# Patient Record
Sex: FEMALE | Race: WHITE | Employment: OTHER | URBAN - METROPOLITAN AREA
[De-identification: names, ages, dates, MRNs, and addresses within clinical notes are randomized per-mention and may not be internally consistent; named-entity substitution may affect disease eponyms.]

---

## 2017-05-26 ENCOUNTER — HOSPITAL ENCOUNTER (OUTPATIENT)
Dept: LAB | Age: 67
Discharge: HOME | End: 2017-05-26

## 2017-05-26 PROCEDURE — 88305 TISSUE EXAM BY PATHOLOGIST: CPT | Performed by: INTERNAL MEDICINE

## 2017-05-26 PROCEDURE — 88312 SPECIAL STAINS GROUP 1: CPT | Performed by: INTERNAL MEDICINE

## 2022-09-02 ENCOUNTER — OFFICE VISIT (OUTPATIENT)
Dept: NEUROLOGY | Age: 72
End: 2022-09-02

## 2022-09-02 DIAGNOSIS — G31.01 PRIMARY PROGRESSIVE APHASIA (HCC): Primary | ICD-10-CM

## 2022-09-02 DIAGNOSIS — R56.9 SEIZURE (HCC): ICD-10-CM

## 2022-09-02 DIAGNOSIS — F02.80 PRIMARY PROGRESSIVE APHASIA (HCC): Primary | ICD-10-CM

## 2022-09-02 PROCEDURE — 99214 OFFICE O/P EST MOD 30 MIN: CPT | Performed by: PSYCHIATRY & NEUROLOGY

## 2022-09-02 PROCEDURE — 1123F ACP DISCUSS/DSCN MKR DOCD: CPT | Performed by: PSYCHIATRY & NEUROLOGY

## 2022-09-02 ASSESSMENT — ENCOUNTER SYMPTOMS
EYES NEGATIVE: 1
ALLERGIC/IMMUNOLOGIC NEGATIVE: 1
GASTROINTESTINAL NEGATIVE: 1
RESPIRATORY NEGATIVE: 1

## 2022-09-02 NOTE — PROGRESS NOTES
vitamins as well as working with her to the point where now she can actually brush her teeth and spit out with assistance and that she could not do she lost that 2 years ago. Her recent MRI of her brain is unchanged from 2017 showing mild white matter changes of the brain. She is severely demented had very diffuse difficulties communicating she is a full assistance she has difficulties in ambulating as well. IMAGING REVIEW:  I REVIEWED PERTINENT  IMAGES AND REPORTS WITH THE PATIENT PERSONALLY, DIRECTLY AND FULLY.      Past Medical History:  Past Medical History:   Diagnosis Date    Fibroid, uterine     Other ill-defined conditions(799.89)     Hypothyroidism    Psychiatric disorder     Depression       Past Surgical History:  Past Surgical History:   Procedure Laterality Date    ADENOIDECTOMY      APPENDECTOMY      GYN      Partial Hysterectomy    TONSILLECTOMY         Social History:  Social History     Socioeconomic History    Marital status:      Spouse name: Not on file    Number of children: Not on file    Years of education: Not on file    Highest education level: Not on file   Occupational History    Not on file   Tobacco Use    Smoking status: Never    Smokeless tobacco: Never   Substance and Sexual Activity    Alcohol use: Yes    Drug use: No    Sexual activity: Not on file   Other Topics Concern    Not on file   Social History Narrative    Not on file     Social Determinants of Health     Financial Resource Strain: Not on file   Food Insecurity: Not on file   Transportation Needs: Not on file   Physical Activity: Not on file   Stress: Not on file   Social Connections: Not on file   Intimate Partner Violence: Not on file   Housing Stability: Not on file       Family History:   Family History   Problem Relation Age of Onset    Cancer Mother     Heart Disease Father        Current Outpatient Medications on File Prior to Visit   Medication Sig Dispense Refill    citalopram (CELEXA) 20 MG tablet Take 1 tablet by mouth daily      clonazePAM (KLONOPIN) 0.5 MG tablet Take 0.5 mg by mouth 2 times daily as needed. lamoTRIgine (LAMICTAL) 25 MG tablet Take 1 bid for one month than 2 bid      levETIRAcetam (KEPPRA) 250 MG tablet Take 250 mg by mouth 2 times daily      levothyroxine (SYNTHROID) 75 MCG tablet Take 75 mcg by mouth daily      memantine (NAMENDA) 10 MG tablet Take 10 mg by mouth 2 times daily      mirtazapine (REMERON) 15 MG tablet Take 15 mg by mouth daily       No current facility-administered medications on file prior to visit. Allergies   Allergen Reactions    Sulfa Antibiotics Other (See Comments)     Hematuria    Wasp Venom Protein Hives and Swelling    Penicillins Rash       Review of Systems:  Review of Systems   Constitutional: Negative. HENT: Negative. Eyes: Negative. Respiratory: Negative. Gastrointestinal: Negative. Endocrine: Negative. Genitourinary: Negative. Musculoskeletal: Negative. Skin: Negative. Allergic/Immunologic: Negative. Neurological:         Severe memory loss and progressive aphasia    No flowsheet data found. No flowsheet data found. There were no vitals filed for this visit. Physical Exam  Constitutional:       Appearance: Normal appearance. HENT:      Head: Normocephalic and atraumatic. Eyes:      Extraocular Movements: Extraocular movements intact and EOM normal.   Cardiovascular:      Rate and Rhythm: Normal rate and regular rhythm. Pulses: Normal pulses. Pulmonary:      Effort: Pulmonary effort is normal.   Abdominal:      General: Abdomen is flat. Neurological:      Mental Status: She is alert. Deep Tendon Reflexes:      Reflex Scores:       Tricep reflexes are 1+ on the right side and 1+ on the left side. Bicep reflexes are 1+ on the right side and 1+ on the left side. Brachioradialis reflexes are 1+ on the right side and 1+ on the left side.        Patellar reflexes are 1+ on the right side and 1+ on the left side. Achilles reflexes are 1+ on the right side and 1+ on the left side. Neurologic Exam     Mental Status   She is incapable of making any kind of functional statements or sentences. Incapable of actually answering questions appropriately. Cranial Nerves     CN II   Visual fields full to confrontation. CN III, IV, VI   Extraocular motions are normal.     CN VII   Facial expression full, symmetric. Motor Exam   Right arm tone: decreased  Left arm tone: decreased  Right leg tone: decreased  Left leg tone: decreasedShe is extremely slow and bradykinetic but there is no cogwheel rigidity     Gait, Coordination, and Reflexes     Gait  Gait: wide-based    Tremor   Resting tremor: absent  Intention tremor: absent  Action tremor: absent    Reflexes   Right brachioradialis: 1+  Left brachioradialis: 1+  Right biceps: 1+  Left biceps: 1+  Right triceps: 1+  Left triceps: 1+  Right patellar: 1+  Left patellar: 1+  Right achilles: 1+  Left achilles: 1+She needs assistance with gait. Assessment   Assessment / Plan:    Diagnoses and all orders for this visit:    Primary progressive aphasia ([facility] [address] at this time the patient actually had a minute minimal amount of improvement her grandson has been working with her now she can try to brush her teeth and spit it out. We are going to reinitiate Aricept at 10 mg at bedtime. She is getting continue Namenda 10 twice a day and she is on Klonopin it was 0.5 mg but her grandson is giving her 1/8th of the dose but I am not sure exactly how it is being given nor does the son who is with her. The Keppra she is getting a quarter of the dose or tablet twice a day and the tablets 250 mg which is not doing anything for they discontinued her Lamictal because it made her sedated. Essentially she is not on a seizure medicine the Keppra such a low-dose its not really treating her.   I suggested that if she has any further seizures were going to need to retry a new antiepileptic medication at more appropriate doses. Apparently she is on a bunch of vitamins and \"oils\" and I am not sure if this is CBD oil. If it is CBD oil that may help elevate the seizure threshold. Seizure [facility])        The Diagnosis and differential diagnostic considerations, and Rx Tx were reviewed with the patient at length. No orders of the defined types were placed in this encounter. I have spent greater than 50% of visit discussing and counseling of patient  for treatment and diagnostic plan review. Total time 30 min     . Notes: Patient is to continue all medications as directed by prescribing physicians. Continuations on today's visit are made based on the patient's report of current medications.              Dr. Randye Nyhan  Consultation Neurology, Neurodiagnostics and Neurotherapeutics  Neuroelectrophysiology, EEG, EMG  [facility]  [address] [place] [address], [address] [facility]  Phone:  [phone]  Fax:   [phone]

## 2022-09-27 ENCOUNTER — HOSPITAL ENCOUNTER (EMERGENCY)
Age: 72
Discharge: HOME | End: 2022-09-27
Attending: EMERGENCY MEDICINE
Payer: COMMERCIAL

## 2022-09-27 VITALS
RESPIRATION RATE: 15 BRPM | HEIGHT: 66 IN | DIASTOLIC BLOOD PRESSURE: 69 MMHG | HEART RATE: 67 BPM | SYSTOLIC BLOOD PRESSURE: 116 MMHG | BODY MASS INDEX: 21.69 KG/M2 | OXYGEN SATURATION: 94 % | TEMPERATURE: 97.9 F | WEIGHT: 135 LBS

## 2022-09-27 DIAGNOSIS — R56.9 SEIZURE (HCC): Primary | ICD-10-CM

## 2022-09-27 LAB
ALBUMIN SERPL-MCNC: 3.2 G/DL (ref 3.2–4.6)
ALBUMIN/GLOB SERPL: 0.9 {RATIO} (ref 1.2–3.5)
ALP SERPL-CCNC: 92 U/L (ref 50–136)
ALT SERPL-CCNC: 30 U/L (ref 12–65)
ANION GAP SERPL CALC-SCNC: 1 MMOL/L (ref 4–13)
AST SERPL-CCNC: 31 U/L (ref 15–37)
BASOPHILS # BLD: 0 K/UL (ref 0–0.2)
BASOPHILS NFR BLD: 1 % (ref 0–2)
BILIRUB SERPL-MCNC: 0.3 MG/DL (ref 0.2–1.1)
BUN SERPL-MCNC: 13 MG/DL (ref 8–23)
CALCIUM SERPL-MCNC: 9.6 MG/DL (ref 8.3–10.4)
CHLORIDE SERPL-SCNC: 107 MMOL/L (ref 101–110)
CO2 SERPL-SCNC: 28 MMOL/L (ref 21–32)
CREAT SERPL-MCNC: 0.7 MG/DL (ref 0.6–1)
DIFFERENTIAL METHOD BLD: ABNORMAL
EOSINOPHIL # BLD: 0.3 K/UL (ref 0–0.8)
EOSINOPHIL NFR BLD: 7 % (ref 0.5–7.8)
ERYTHROCYTE [DISTWIDTH] IN BLOOD BY AUTOMATED COUNT: 13.4 % (ref 11.9–14.6)
GLOBULIN SER CALC-MCNC: 3.7 G/DL (ref 2.3–3.5)
GLUCOSE SERPL-MCNC: 99 MG/DL (ref 65–100)
HCT VFR BLD AUTO: 41.3 % (ref 35.8–46.3)
HGB BLD-MCNC: 13.6 G/DL (ref 11.7–15.4)
IMM GRANULOCYTES # BLD AUTO: 0 K/UL (ref 0–0.5)
IMM GRANULOCYTES NFR BLD AUTO: 1 % (ref 0–5)
LYMPHOCYTES # BLD: 0.8 K/UL (ref 0.5–4.6)
LYMPHOCYTES NFR BLD: 20 % (ref 13–44)
MAGNESIUM SERPL-MCNC: 2.5 MG/DL (ref 1.8–2.4)
MCH RBC QN AUTO: 30.6 PG (ref 26.1–32.9)
MCHC RBC AUTO-ENTMCNC: 32.9 G/DL (ref 31.4–35)
MCV RBC AUTO: 92.8 FL (ref 79.6–97.8)
MONOCYTES # BLD: 0.2 K/UL (ref 0.1–1.3)
MONOCYTES NFR BLD: 6 % (ref 4–12)
NEUTS SEG # BLD: 2.5 K/UL (ref 1.7–8.2)
NEUTS SEG NFR BLD: 65 % (ref 43–78)
NRBC # BLD: 0 K/UL (ref 0–0.2)
PLATELET # BLD AUTO: 299 K/UL (ref 150–450)
PMV BLD AUTO: 8.9 FL (ref 9.4–12.3)
POTASSIUM SERPL-SCNC: 4.2 MMOL/L (ref 3.5–5.1)
PROT SERPL-MCNC: 6.9 G/DL (ref 6.3–8.2)
RBC # BLD AUTO: 4.45 M/UL (ref 4.05–5.2)
SODIUM SERPL-SCNC: 136 MMOL/L (ref 136–145)
WBC # BLD AUTO: 3.8 K/UL (ref 4.3–11.1)

## 2022-09-27 PROCEDURE — 83735 ASSAY OF MAGNESIUM: CPT

## 2022-09-27 PROCEDURE — 85025 COMPLETE CBC W/AUTO DIFF WBC: CPT

## 2022-09-27 PROCEDURE — 80053 COMPREHEN METABOLIC PANEL: CPT

## 2022-09-27 PROCEDURE — 99284 EMERGENCY DEPT VISIT MOD MDM: CPT

## 2022-09-27 ASSESSMENT — ENCOUNTER SYMPTOMS: VOMITING: 0

## 2022-09-27 NOTE — DISCHARGE INSTRUCTIONS
Please return with any fevers, vomiting, confusion, worsening symptoms, or additional concerns. Follow-up with your neurologist for reevaluation.

## 2022-09-27 NOTE — ED NOTES
I have reviewed discharge instructions with the patient and spouse. The patient and spouse verbalized understanding. Patient left ED via Discharge Method: wheelchair to Home with spouse. Opportunity for questions and clarification provided. Patient given 0 scripts. To continue your aftercare when you leave the hospital, you may receive an automated call from our care team to check in on how you are doing. This is a free service and part of our promise to provide the best care and service to meet your aftercare needs.  If you have questions, or wish to unsubscribe from this service please call [phone]. Thank you for Choosing our [facility] Emergency Department.         Kenya Azar RN  09/27/22 3198

## 2022-09-27 NOTE — ED PROVIDER NOTES
Emergency Department Provider Note                   PCP:                Rashaad Muniz MD               Age: 70 y.o. Sex: female       ICD-10-CM    1. Seizure ([facility] [address]  R56.9           DISPOSITION Decision To Discharge 09/27/2022 03:07:16 PM        [facility]       ED Course as of 09/27/22 1514   Tue Sep 27, 2022   1506 His blood work is unremarkable. I do not think she needs any acute intervention. I will discharge her home. [AC]      ED Course User Index  [AC] Cruz Núñez MD        Orders Placed This Encounter   Procedures    CBC with Auto Differential    CMP    Magnesium    Pulse Oximetry    Saline lock IV    Seizure precautions        Medications - No data to display    New Prescriptions    No medications on file        Dinorah Falcon is a 70 y.o. female who presents to the Emergency Department with chief complaint of    Chief Complaint   Patient presents with    Seizures      70-year-old lady presents with Marina's concerns about having had a seizure. History of seizures. I spoke with the son who provided most of the history. He reports that they are trying to wean her off of her Keppra because of some neurocognitive effects. He says that the neurologist warned them that she would in fact have seizures like this. He said that this 1 seem to be a little more vigorous than they anticipated. She had no other recent symptoms including no vomiting, fevers, cough, shortness of breath. Currently she is at her mental baseline. No other associated symptoms. Elements of this note were created using speech recognition software. As such, errors of speech recognition may be present. Review of Systems   Unable to perform ROS: Patient nonverbal (Obtained from son)   Constitutional:  Negative for chills and fever. Gastrointestinal:  Negative for vomiting. Psychiatric/Behavioral:  Negative for confusion.       Past Medical History:   Diagnosis Date    Fibroid, uterine     Other ill-defined WBC 3.8 (L) 4.3 - 11.1 K/uL    RBC 4.45 4.05 - 5.2 M/uL    Hemoglobin 13.6 11.7 - 15.4 g/dL    Hematocrit 41.3 35.8 - 46.3 %    MCV 92.8 79.6 - 97.8 FL    MCH 30.6 26.1 - 32.9 PG    MCHC 32.9 31.4 - 35.0 g/dL    RDW 13.4 11.9 - 14.6 %    Platelets 168 020 - 414 K/uL    MPV 8.9 (L) 9.4 - 12.3 FL    nRBC 0.00 0.0 - 0.2 K/uL    Differential Type AUTOMATED      Seg Neutrophils 65 43 - 78 %    Lymphocytes 20 13 - 44 %    Monocytes 6 4.0 - 12.0 %    Eosinophils % 7 0.5 - 7.8 %    Basophils 1 0.0 - 2.0 %    Immature Granulocytes 1 0.0 - 5.0 %    Segs Absolute 2.5 1.7 - 8.2 K/UL    Absolute Lymph # 0.8 0.5 - 4.6 K/UL    Absolute Mono # 0.2 0.1 - 1.3 K/UL    Absolute Eos # 0.3 0.0 - 0.8 K/UL    Basophils Absolute 0.0 0.0 - 0.2 K/UL    Absolute Immature Granulocyte 0.0 0.0 - 0.5 K/UL   CMP   Result Value Ref Range    Sodium 136 136 - 145 mmol/L    Potassium 4.2 3.5 - 5.1 mmol/L    Chloride 107 101 - 110 mmol/L    CO2 28 21 - 32 mmol/L    Anion Gap 1 (L) 4 - 13 mmol/L    Glucose 99 65 - 100 mg/dL    BUN 13 8 - 23 MG/DL    Creatinine 0.70 0.6 - 1.0 MG/DL    GFR African American >60 >60 ml/min/1.73m2    GFR Non- >60 >60 ml/min/1.73m2    Calcium 9.6 8.3 - 10.4 MG/DL    Total Bilirubin 0.3 0.2 - 1.1 MG/DL    ALT 30 12 - 65 U/L    AST 31 15 - 37 U/L    Alk Phosphatase 92 50 - 136 U/L    Total Protein 6.9 6.3 - 8.2 g/dL    Albumin 3.2 3.2 - 4.6 g/dL    Globulin 3.7 (H) 2.3 - 3.5 g/dL    Albumin/Globulin Ratio 0.9 (L) 1.2 - 3.5     Magnesium   Result Value Ref Range    Magnesium 2.5 (H) 1.8 - 2.4 mg/dL        No orders to display                       Voice dictation software was used during the making of this note. This software is not perfect and grammatical and other typographical errors may be present. This note has not been completely proofread for errors.        August Perez MD  09/27/22 5994

## 2022-09-27 NOTE — ED TRIAGE NOTES
Pt to ed via ems from home for c/o of seizure. 3rd seizure known to family. Pt sees neurologist for seizures. Pt takes keppra and clonazepam daily. Pt did not take this morning.  Son gave her medicine with ems before arriving at hospital.   Bgl 118  BP: 118/72  HR: 72  Resp: 16  O2 sat: 94 RA

## 2022-09-29 ENCOUNTER — CLINICAL DOCUMENTATION (OUTPATIENT)
Dept: NEUROLOGY | Age: 72
End: 2022-09-29

## 2022-09-29 NOTE — PROGRESS NOTES
The patient did see a  on 9/22/2022. For her frontal temporal lobe dementia/primary progressive aphasia. He is going to move forward with genetic testing but its not been done as of yet.   The genetic testing is going to do is [id number] start and then move to other labs

## 2022-11-16 ENCOUNTER — APPOINTMENT (OUTPATIENT)
Dept: GENERAL RADIOLOGY | Age: 72
DRG: 689 | End: 2022-11-16
Payer: MEDICARE

## 2022-11-16 ENCOUNTER — APPOINTMENT (OUTPATIENT)
Dept: CT IMAGING | Age: 72
DRG: 689 | End: 2022-11-16
Payer: MEDICARE

## 2022-11-16 ENCOUNTER — APPOINTMENT (OUTPATIENT)
Dept: ULTRASOUND IMAGING | Age: 72
DRG: 689 | End: 2022-11-16
Payer: MEDICARE

## 2022-11-16 ENCOUNTER — HOSPITAL ENCOUNTER (INPATIENT)
Age: 72
LOS: 5 days | Discharge: HOSPICE | DRG: 689 | End: 2022-11-21
Attending: EMERGENCY MEDICINE | Admitting: INTERNAL MEDICINE
Payer: MEDICARE

## 2022-11-16 DIAGNOSIS — E86.0 MILD DEHYDRATION: ICD-10-CM

## 2022-11-16 DIAGNOSIS — R13.10 DYSPHAGIA, UNSPECIFIED TYPE: ICD-10-CM

## 2022-11-16 DIAGNOSIS — G31.01 PRIMARY PROGRESSIVE APHASIA (HCC): ICD-10-CM

## 2022-11-16 DIAGNOSIS — N30.00 ACUTE CYSTITIS WITHOUT HEMATURIA: Primary | ICD-10-CM

## 2022-11-16 DIAGNOSIS — R11.2 NAUSEA AND VOMITING, UNSPECIFIED VOMITING TYPE: ICD-10-CM

## 2022-11-16 DIAGNOSIS — F02.80 PRIMARY PROGRESSIVE APHASIA (HCC): ICD-10-CM

## 2022-11-16 PROBLEM — G93.41 ACUTE METABOLIC ENCEPHALOPATHY: Status: ACTIVE | Noted: 2022-11-16

## 2022-11-16 PROBLEM — N39.0 ACUTE UTI: Status: ACTIVE | Noted: 2022-11-16

## 2022-11-16 PROBLEM — G31.09 FRONTOTEMPORAL DEMENTIA (HCC): Status: ACTIVE | Noted: 2022-11-16

## 2022-11-16 PROBLEM — R47.01 PROGRESSIVE APHASIA: Status: ACTIVE | Noted: 2022-11-16

## 2022-11-16 PROBLEM — G40.909 SEIZURE DISORDER (HCC): Status: ACTIVE | Noted: 2022-11-16

## 2022-11-16 LAB
ALBUMIN SERPL-MCNC: 3 G/DL (ref 3.2–4.6)
ALBUMIN/GLOB SERPL: 0.8 {RATIO} (ref 0.4–1.6)
ALP SERPL-CCNC: 69 U/L (ref 50–136)
ALT SERPL-CCNC: 24 U/L (ref 12–65)
ANION GAP SERPL CALC-SCNC: 7 MMOL/L (ref 2–11)
APPEARANCE UR: ABNORMAL
AST SERPL-CCNC: 39 U/L (ref 15–37)
BACTERIA URNS QL MICRO: ABNORMAL /HPF
BASOPHILS # BLD: 0 K/UL (ref 0–0.2)
BASOPHILS NFR BLD: 1 % (ref 0–2)
BILIRUB SERPL-MCNC: 0.5 MG/DL (ref 0.2–1.1)
BILIRUB UR QL: NEGATIVE
BUN SERPL-MCNC: 6 MG/DL (ref 8–23)
CALCIUM SERPL-MCNC: 9.4 MG/DL (ref 8.3–10.4)
CASTS URNS QL MICRO: ABNORMAL /LPF
CHLORIDE SERPL-SCNC: 101 MMOL/L (ref 101–110)
CK SERPL-CCNC: 188 U/L (ref 21–215)
CO2 SERPL-SCNC: 26 MMOL/L (ref 21–32)
COLOR UR: YELLOW
CREAT SERPL-MCNC: 0.5 MG/DL (ref 0.6–1)
DIFFERENTIAL METHOD BLD: ABNORMAL
EKG ATRIAL RATE: 85 BPM
EKG DIAGNOSIS: NORMAL
EKG Q-T INTERVAL: 343 MS
EKG QRS DURATION: 89 MS
EKG QTC CALCULATION (BAZETT): 406 MS
EKG R AXIS: 82 DEGREES
EKG T AXIS: 56 DEGREES
EKG VENTRICULAR RATE: 84 BPM
EOSINOPHIL # BLD: 0.2 K/UL (ref 0–0.8)
EOSINOPHIL NFR BLD: 3 % (ref 0.5–7.8)
EPI CELLS #/AREA URNS HPF: ABNORMAL /HPF
ERYTHROCYTE [DISTWIDTH] IN BLOOD BY AUTOMATED COUNT: 13.8 % (ref 11.9–14.6)
FLUAV AG NPH QL IA: NEGATIVE
FLUBV AG NPH QL IA: NEGATIVE
GLOBULIN SER CALC-MCNC: 3.7 G/DL (ref 2.8–4.5)
GLUCOSE SERPL-MCNC: 118 MG/DL (ref 65–100)
GLUCOSE UR STRIP.AUTO-MCNC: NEGATIVE MG/DL
HCT VFR BLD AUTO: 40.1 % (ref 35.8–46.3)
HGB BLD-MCNC: 13.2 G/DL (ref 11.7–15.4)
HGB UR QL STRIP: NEGATIVE
IMM GRANULOCYTES # BLD AUTO: 0 K/UL (ref 0–0.5)
IMM GRANULOCYTES NFR BLD AUTO: 0 % (ref 0–5)
KETONES UR QL STRIP.AUTO: 15 MG/DL
LACTATE SERPL-SCNC: 1 MMOL/L (ref 0.4–2)
LEUKOCYTE ESTERASE UR QL STRIP.AUTO: ABNORMAL
LIPASE SERPL-CCNC: 105 U/L (ref 73–393)
LYMPHOCYTES # BLD: 0.7 K/UL (ref 0.5–4.6)
LYMPHOCYTES NFR BLD: 11 % (ref 13–44)
MCH RBC QN AUTO: 30.1 PG (ref 26.1–32.9)
MCHC RBC AUTO-ENTMCNC: 32.9 G/DL (ref 31.4–35)
MCV RBC AUTO: 91.3 FL (ref 82–102)
MONOCYTES # BLD: 0.4 K/UL (ref 0.1–1.3)
MONOCYTES NFR BLD: 7 % (ref 4–12)
MUCOUS THREADS URNS QL MICRO: ABNORMAL /LPF
NEUTS SEG # BLD: 4.9 K/UL (ref 1.7–8.2)
NEUTS SEG NFR BLD: 78 % (ref 43–78)
NITRITE UR QL STRIP.AUTO: POSITIVE
NRBC # BLD: 0 K/UL (ref 0–0.2)
NT PRO BNP: 309 PG/ML (ref 5–125)
OTHER OBSERVATIONS: ABNORMAL
PH UR STRIP: 6.5 [PH] (ref 5–9)
PLATELET # BLD AUTO: 326 K/UL (ref 150–450)
PMV BLD AUTO: 9.4 FL (ref 9.4–12.3)
POTASSIUM SERPL-SCNC: 4.1 MMOL/L (ref 3.5–5.1)
PROT SERPL-MCNC: 6.7 G/DL (ref 6.3–8.2)
PROT UR STRIP-MCNC: NEGATIVE MG/DL
RBC # BLD AUTO: 4.39 M/UL (ref 4.05–5.2)
RBC #/AREA URNS HPF: ABNORMAL /HPF
SARS-COV-2 RDRP RESP QL NAA+PROBE: NOT DETECTED
SODIUM SERPL-SCNC: 134 MMOL/L (ref 133–143)
SOURCE: NORMAL
SP GR UR REFRACTOMETRY: 1.02 (ref 1–1.02)
SPECIMEN SOURCE: NORMAL
TROPONIN I SERPL HS-MCNC: 62 PG/ML (ref 0–14)
TROPONIN I SERPL HS-MCNC: 64 PG/ML (ref 0–14)
UROBILINOGEN UR QL STRIP.AUTO: 1 EU/DL (ref 0.2–1)
WBC # BLD AUTO: 6.2 K/UL (ref 4.3–11.1)
WBC URNS QL MICRO: ABNORMAL /HPF

## 2022-11-16 PROCEDURE — 2580000003 HC RX 258: Performed by: FAMILY MEDICINE

## 2022-11-16 PROCEDURE — 82550 ASSAY OF CK (CPK): CPT

## 2022-11-16 PROCEDURE — 6360000004 HC RX CONTRAST MEDICATION: Performed by: EMERGENCY MEDICINE

## 2022-11-16 PROCEDURE — A4216 STERILE WATER/SALINE, 10 ML: HCPCS | Performed by: FAMILY MEDICINE

## 2022-11-16 PROCEDURE — 83690 ASSAY OF LIPASE: CPT

## 2022-11-16 PROCEDURE — 6360000002 HC RX W HCPCS: Performed by: FAMILY MEDICINE

## 2022-11-16 PROCEDURE — 87088 URINE BACTERIA CULTURE: CPT

## 2022-11-16 PROCEDURE — 87040 BLOOD CULTURE FOR BACTERIA: CPT

## 2022-11-16 PROCEDURE — 85025 COMPLETE CBC W/AUTO DIFF WBC: CPT

## 2022-11-16 PROCEDURE — 6370000000 HC RX 637 (ALT 250 FOR IP): Performed by: FAMILY MEDICINE

## 2022-11-16 PROCEDURE — 87205 SMEAR GRAM STAIN: CPT

## 2022-11-16 PROCEDURE — 6360000002 HC RX W HCPCS: Performed by: EMERGENCY MEDICINE

## 2022-11-16 PROCEDURE — 97166 OT EVAL MOD COMPLEX 45 MIN: CPT

## 2022-11-16 PROCEDURE — 87804 INFLUENZA ASSAY W/OPTIC: CPT

## 2022-11-16 PROCEDURE — 2580000003 HC RX 258: Performed by: EMERGENCY MEDICINE

## 2022-11-16 PROCEDURE — 80053 COMPREHEN METABOLIC PANEL: CPT

## 2022-11-16 PROCEDURE — 87150 DNA/RNA AMPLIFIED PROBE: CPT

## 2022-11-16 PROCEDURE — 96365 THER/PROPH/DIAG IV INF INIT: CPT

## 2022-11-16 PROCEDURE — 1100000000 HC RM PRIVATE

## 2022-11-16 PROCEDURE — 74018 RADEX ABDOMEN 1 VIEW: CPT

## 2022-11-16 PROCEDURE — 99285 EMERGENCY DEPT VISIT HI MDM: CPT

## 2022-11-16 PROCEDURE — C9113 INJ PANTOPRAZOLE SODIUM, VIA: HCPCS | Performed by: FAMILY MEDICINE

## 2022-11-16 PROCEDURE — 97530 THERAPEUTIC ACTIVITIES: CPT

## 2022-11-16 PROCEDURE — 92610 EVALUATE SWALLOWING FUNCTION: CPT

## 2022-11-16 PROCEDURE — 84484 ASSAY OF TROPONIN QUANT: CPT

## 2022-11-16 PROCEDURE — 97112 NEUROMUSCULAR REEDUCATION: CPT

## 2022-11-16 PROCEDURE — 70450 CT HEAD/BRAIN W/O DYE: CPT

## 2022-11-16 PROCEDURE — 83605 ASSAY OF LACTIC ACID: CPT

## 2022-11-16 PROCEDURE — 83880 ASSAY OF NATRIURETIC PEPTIDE: CPT

## 2022-11-16 PROCEDURE — 87086 URINE CULTURE/COLONY COUNT: CPT

## 2022-11-16 PROCEDURE — 71260 CT THORAX DX C+: CPT | Performed by: EMERGENCY MEDICINE

## 2022-11-16 PROCEDURE — 81003 URINALYSIS AUTO W/O SCOPE: CPT

## 2022-11-16 PROCEDURE — 97163 PT EVAL HIGH COMPLEX 45 MIN: CPT

## 2022-11-16 PROCEDURE — 76770 US EXAM ABDO BACK WALL COMP: CPT

## 2022-11-16 PROCEDURE — 87186 SC STD MICRODIL/AGAR DIL: CPT

## 2022-11-16 PROCEDURE — 71045 X-RAY EXAM CHEST 1 VIEW: CPT

## 2022-11-16 PROCEDURE — 93005 ELECTROCARDIOGRAM TRACING: CPT | Performed by: EMERGENCY MEDICINE

## 2022-11-16 PROCEDURE — 87635 SARS-COV-2 COVID-19 AMP PRB: CPT

## 2022-11-16 RX ORDER — SODIUM CHLORIDE 9 MG/ML
INJECTION, SOLUTION INTRAVENOUS PRN
Status: DISCONTINUED | OUTPATIENT
Start: 2022-11-16 | End: 2022-11-21 | Stop reason: HOSPADM

## 2022-11-16 RX ORDER — MEMANTINE HYDROCHLORIDE 5 MG/1
10 TABLET ORAL 2 TIMES DAILY
Status: DISCONTINUED | OUTPATIENT
Start: 2022-11-16 | End: 2022-11-21 | Stop reason: HOSPADM

## 2022-11-16 RX ORDER — DONEPEZIL HYDROCHLORIDE 10 MG/1
10 TABLET, FILM COATED ORAL NIGHTLY
Status: ON HOLD | COMMUNITY
End: 2022-11-21 | Stop reason: HOSPADM

## 2022-11-16 RX ORDER — ONDANSETRON 2 MG/ML
4 INJECTION INTRAMUSCULAR; INTRAVENOUS EVERY 6 HOURS PRN
Status: DISCONTINUED | OUTPATIENT
Start: 2022-11-16 | End: 2022-11-21 | Stop reason: HOSPADM

## 2022-11-16 RX ORDER — ACETAMINOPHEN 325 MG/1
650 TABLET ORAL EVERY 6 HOURS PRN
Status: DISCONTINUED | OUTPATIENT
Start: 2022-11-16 | End: 2022-11-21 | Stop reason: HOSPADM

## 2022-11-16 RX ORDER — POLYETHYLENE GLYCOL 3350 17 G/17G
17 POWDER, FOR SOLUTION ORAL DAILY
Status: DISCONTINUED | OUTPATIENT
Start: 2022-11-16 | End: 2022-11-21 | Stop reason: HOSPADM

## 2022-11-16 RX ORDER — POLYETHYLENE GLYCOL 3350 17 G/17G
17 POWDER, FOR SOLUTION ORAL DAILY PRN
Status: DISCONTINUED | OUTPATIENT
Start: 2022-11-16 | End: 2022-11-21 | Stop reason: HOSPADM

## 2022-11-16 RX ORDER — LEVOTHYROXINE SODIUM 0.1 MG/1
100 TABLET ORAL DAILY
Status: DISCONTINUED | OUTPATIENT
Start: 2022-11-16 | End: 2022-11-21 | Stop reason: HOSPADM

## 2022-11-16 RX ORDER — LEVETIRACETAM 250 MG/1
250 TABLET ORAL 2 TIMES DAILY
Status: DISCONTINUED | OUTPATIENT
Start: 2022-11-16 | End: 2022-11-16

## 2022-11-16 RX ORDER — SODIUM CHLORIDE 0.9 % (FLUSH) 0.9 %
5-40 SYRINGE (ML) INJECTION EVERY 12 HOURS SCHEDULED
Status: DISCONTINUED | OUTPATIENT
Start: 2022-11-16 | End: 2022-11-21 | Stop reason: HOSPADM

## 2022-11-16 RX ORDER — LORAZEPAM 2 MG/ML
1 INJECTION INTRAMUSCULAR EVERY 4 HOURS PRN
Status: DISCONTINUED | OUTPATIENT
Start: 2022-11-16 | End: 2022-11-20

## 2022-11-16 RX ORDER — FLUTICASONE PROPIONATE 50 MCG
1 SPRAY, SUSPENSION (ML) NASAL DAILY
Status: DISCONTINUED | OUTPATIENT
Start: 2022-11-16 | End: 2022-11-21 | Stop reason: HOSPADM

## 2022-11-16 RX ORDER — CLONAZEPAM 0.5 MG/1
0.25 TABLET ORAL EVERY 12 HOURS
Status: DISCONTINUED | OUTPATIENT
Start: 2022-11-16 | End: 2022-11-21 | Stop reason: HOSPADM

## 2022-11-16 RX ORDER — BISACODYL 10 MG
10 SUPPOSITORY, RECTAL RECTAL DAILY
Status: DISPENSED | OUTPATIENT
Start: 2022-11-16 | End: 2022-11-19

## 2022-11-16 RX ORDER — ASPIRIN 81 MG/1
81 TABLET, CHEWABLE ORAL DAILY
Status: ON HOLD | COMMUNITY
End: 2022-11-21 | Stop reason: HOSPADM

## 2022-11-16 RX ORDER — LEVETIRACETAM 100 MG/ML
20 SOLUTION ORAL 2 TIMES DAILY
Status: DISCONTINUED | OUTPATIENT
Start: 2022-11-16 | End: 2022-11-18

## 2022-11-16 RX ORDER — LAMOTRIGINE 25 MG/1
50 TABLET ORAL 2 TIMES DAILY
Status: DISCONTINUED | OUTPATIENT
Start: 2022-11-16 | End: 2022-11-16

## 2022-11-16 RX ORDER — ONDANSETRON 8 MG/1
4 TABLET, ORALLY DISINTEGRATING ORAL EVERY 8 HOURS PRN
Status: DISCONTINUED | OUTPATIENT
Start: 2022-11-16 | End: 2022-11-21 | Stop reason: HOSPADM

## 2022-11-16 RX ORDER — ACETAMINOPHEN 650 MG/1
650 SUPPOSITORY RECTAL EVERY 6 HOURS PRN
Status: DISCONTINUED | OUTPATIENT
Start: 2022-11-16 | End: 2022-11-21 | Stop reason: HOSPADM

## 2022-11-16 RX ORDER — CITALOPRAM 20 MG/1
20 TABLET ORAL DAILY
Status: DISCONTINUED | OUTPATIENT
Start: 2022-11-16 | End: 2022-11-16

## 2022-11-16 RX ORDER — ASPIRIN 81 MG/1
81 TABLET ORAL DAILY
Status: DISCONTINUED | OUTPATIENT
Start: 2022-11-16 | End: 2022-11-21 | Stop reason: HOSPADM

## 2022-11-16 RX ORDER — SODIUM CHLORIDE, SODIUM LACTATE, POTASSIUM CHLORIDE, CALCIUM CHLORIDE 600; 310; 30; 20 MG/100ML; MG/100ML; MG/100ML; MG/100ML
INJECTION, SOLUTION INTRAVENOUS CONTINUOUS
Status: DISCONTINUED | OUTPATIENT
Start: 2022-11-16 | End: 2022-11-16

## 2022-11-16 RX ORDER — CLONAZEPAM 0.5 MG/1
0.25 TABLET ORAL EVERY 12 HOURS PRN
Status: DISCONTINUED | OUTPATIENT
Start: 2022-11-16 | End: 2022-11-16

## 2022-11-16 RX ORDER — SODIUM CHLORIDE 0.9 % (FLUSH) 0.9 %
5-40 SYRINGE (ML) INJECTION PRN
Status: DISCONTINUED | OUTPATIENT
Start: 2022-11-16 | End: 2022-11-21 | Stop reason: HOSPADM

## 2022-11-16 RX ADMIN — IOPAMIDOL 100 ML: 755 INJECTION, SOLUTION INTRAVENOUS at 05:39

## 2022-11-16 RX ADMIN — SODIUM CHLORIDE, PRESERVATIVE FREE 10 ML: 5 INJECTION INTRAVENOUS at 19:51

## 2022-11-16 RX ADMIN — BISACODYL 10 MG: 10 SUPPOSITORY RECTAL at 17:11

## 2022-11-16 RX ADMIN — CEFTRIAXONE 1000 MG: 1 INJECTION, POWDER, FOR SOLUTION INTRAMUSCULAR; INTRAVENOUS at 06:12

## 2022-11-16 RX ADMIN — SODIUM CHLORIDE 40 MG: 9 INJECTION, SOLUTION INTRAMUSCULAR; INTRAVENOUS; SUBCUTANEOUS at 12:13

## 2022-11-16 RX ADMIN — SODIUM CHLORIDE, POTASSIUM CHLORIDE, SODIUM LACTATE AND CALCIUM CHLORIDE: 600; 310; 30; 20 INJECTION, SOLUTION INTRAVENOUS at 04:17

## 2022-11-16 RX ADMIN — SODIUM CHLORIDE, POTASSIUM CHLORIDE, SODIUM LACTATE AND CALCIUM CHLORIDE: 600; 310; 30; 20 INJECTION, SOLUTION INTRAVENOUS at 16:37

## 2022-11-16 ASSESSMENT — PAIN - FUNCTIONAL ASSESSMENT: PAIN_FUNCTIONAL_ASSESSMENT: NONE - DENIES PAIN

## 2022-11-16 ASSESSMENT — PAIN SCALES - GENERAL: PAINLEVEL_OUTOF10: 0

## 2022-11-16 NOTE — ED NOTES
TRANSFER - OUT REPORT:    Verbal report given to Alicja Newman RN on Jevon  being transferred to [address] [place] [address] for routine progression of patient care       Report consisted of patient's Situation, Background, Assessment and   Recommendations(SBAR). Information from the following report(s) ED SBAR was reviewed with the receiving nurse. [place] Assessment: No data recorded  Lines:   Peripheral IV 11/16/22 Right Antecubital (Active)   Site Assessment Clean, dry & intact 11/16/22 0346   Line Status Blood return noted 11/16/22 0346   Phlebitis Assessment No symptoms 11/16/22 0346   Infiltration Assessment 0 11/16/22 0346       Peripheral IV 11/16/22 Left Forearm (Active)   Site Assessment Clean, dry & intact 11/16/22 0346   Line Status Blood return noted 11/16/22 0346   Phlebitis Assessment No symptoms 11/16/22 0346   Infiltration Assessment 0 11/16/22 0346        Opportunity for questions and clarification was provided.       Patient transported with:  Charlee Danielson RN  11/16/22 9474

## 2022-11-16 NOTE — ED PROVIDER NOTES
Emergency Department Provider Note                   PCP:                Baron Filemon MD               Age: 67 y.o. Sex: female       ICD-10-CM    1. Acute cystitis without hematuria  N30.00       2. Nausea and vomiting, unspecified vomiting type  R11.2       3. Mild dehydration  E86.0       4. Dysphagia, unspecified type  R13.10       5. Primary progressive aphasia ([facility] [address]  G31.01     F02.80           DISPOSITION Decision To Admit 11/16/2022 06:46:33 AM        MDM  Number of Diagnoses or Management Options  Acute cystitis without hematuria  Dysphagia, unspecified type  Mild dehydration  Nausea and vomiting, unspecified vomiting type  Primary progressive aphasia ([facility] [address]  Diagnosis management comments: Work-up reveals patient has some mild dehydration clinically and a urinary tract infection. Given her recent nausea and vomiting and new problem of difficulty swallowing over the last couple weeks we will admit for IV antibiotics and IV hydration. She had also been more immobile over the last couple weeks and had a mildly elevated troponin and BNP, however her CT PE protocol is negative for PE. Incidental 4.1 cm thoracic aortic aneurysm was found and the family has been informed of this finding and need for continued monitoring. Hospitalist agreed to admission. Mental status is baseline with baseline severe aphasia.        Amount and/or Complexity of Data Reviewed  Clinical lab tests: ordered and reviewed  Tests in the radiology section of CPT®: ordered and reviewed  Tests in the medicine section of CPT®: ordered and reviewed  Independent visualization of images, tracings, or specimens: yes    Risk of Complications, Morbidity, and/or Mortality  Presenting problems: moderate  Diagnostic procedures: low  Management options: low    Patient Progress  Patient progress: stable             Orders Placed This Encounter   Procedures    Culture, Blood 1    Culture, Blood 1    Culture, Urine    COVID-19, Rapid Rapid influenza A/B antigens    XR CHEST PORTABLE    CT HEAD WO CONTRAST    CT CHEST PULMONARY EMBOLISM W CONTRAST    CBC with Auto Differential    Comprehensive Metabolic Panel    Urinalysis    Lactic Acid    CK    Lipase    Brain Natriuretic Peptide    Troponin    Troponin    CARDIAC/RESPIRATORY MONITORING    Straight cath    MEASURE RECTAL TEMPERATURE    EKG 12 Lead    Insert peripheral IV        Medications   lactated ringers infusion ( IntraVENous New Bag 11/16/22 1731)   iopamidol (ISOVUE-370) 76 % injection 100 mL (100 mLs IntraVENous Given 11/16/22 0021)   cefTRIAXone (ROCEPHIN) 1,000 mg in sodium chloride 0.9 % 50 mL IVPB mini-bag (0 mg IntraVENous Stopped 11/16/22 0641)       New Prescriptions    No medications on file        Rey Mejia is a 67 y.o. female who presents to the Emergency Department with chief complaint of    Chief Complaint   Patient presents with    Emesis      66-year-old female with a history of advanced progressive aphasia and dementia who is nonverbal presents with concern for vomiting that began this evening and was noted by family. Some of the vomit was noted to be dark in color. Fever reported by EMS prior to arrival.  Patient is unable to give history. Initial history provided to me by the patient's son-in-law who is a emergency department nurse at this hospital.  Apparently the patient has been nonambulatory for a couple of weeks and has not been eating and drinking well for at least a few days. The history is provided by a relative.        Review of Systems   Unable to perform ROS: Dementia     Past Medical History:   Diagnosis Date    Fibroid, uterine     Other ill-defined conditions(999.89)     Hypothyroidism    Psychiatric disorder     Depression        Past Surgical History:   Procedure Laterality Date    ADENOIDECTOMY      APPENDECTOMY      GYN      Partial Hysterectomy    TONSILLECTOMY          Family History   Problem Relation Age of Onset    Cancer Mother Heart Disease Father         Social History     Socioeconomic History    Marital status:    Tobacco Use    Smoking status: Never    Smokeless tobacco: Never   Substance and Sexual Activity    Alcohol use: Yes    Drug use: No         Sulfa antibiotics, Wasp venom protein, and Penicillins     Previous Medications    CITALOPRAM (CELEXA) 20 MG TABLET    Take 1 tablet by mouth daily    CLONAZEPAM (KLONOPIN) 0.5 MG TABLET    Take 0.5 mg by mouth 2 times daily as needed. LAMOTRIGINE (LAMICTAL) 25 MG TABLET    Take 1 bid for one month than 2 bid    LEVETIRACETAM (KEPPRA) 250 MG TABLET    Take 250 mg by mouth 2 times daily    LEVOTHYROXINE (SYNTHROID) 75 MCG TABLET    Take 75 mcg by mouth daily    MEMANTINE (NAMENDA) 10 MG TABLET    Take 10 mg by mouth 2 times daily    MIRTAZAPINE (REMERON) 15 MG TABLET    Take 15 mg by mouth daily        Vitals signs and nursing note reviewed. Patient Vitals for the past 4 hrs:   Temp Pulse Resp BP SpO2   11/16/22 0629 -- 71 14 109/70 94 %   11/16/22 0604 -- 82 15 127/71 94 %   11/16/22 0529 -- 81 16 114/81 95 %   11/16/22 0514 -- 79 15 125/79 95 %   11/16/22 0449 -- 80 19 120/79 94 %   11/16/22 0429 -- 80 16 125/76 94 %   11/16/22 0419 -- 80 18 135/82 96 %   11/16/22 0359 -- 82 15 133/80 95 %   11/16/22 0349 -- 81 19 (!) 132/90 --   11/16/22 0345 99.7 °F (37.6 °C) -- -- -- --   11/16/22 0322 98.5 °F (36.9 °C) 87 17 123/84 98 %          Physical Exam  Vitals and nursing note reviewed. Constitutional:       General: She is not in acute distress. Appearance: Normal appearance. She is ill-appearing. She is not toxic-appearing or diaphoretic. HENT:      Head: Normocephalic and atraumatic. Nose: Nose normal.      Mouth/Throat:      Comments: Mucous membranes are tacky  Eyes:      Conjunctiva/sclera: Conjunctivae normal.      Pupils: Pupils are equal, round, and reactive to light. Cardiovascular:      Rate and Rhythm: Normal rate and regular rhythm.       Pulses: Normal pulses. Heart sounds: Normal heart sounds. Pulmonary:      Effort: Pulmonary effort is normal.      Breath sounds: Normal breath sounds. Abdominal:      General: There is no distension. Palpations: Abdomen is soft. Tenderness: There is no abdominal tenderness. There is no guarding or rebound. Musculoskeletal:         General: Normal range of motion. Skin:     General: Skin is warm and dry. Comments: Reduced skin turgor on the abdomen   Neurological:      Mental Status: She is alert. Mental status is at baseline. GCS: GCS eye subscore is 4. GCS verbal subscore is 2. GCS motor subscore is 5. Cranial Nerves: No facial asymmetry. Sensory: Sensation is intact. Motor: Weakness (Diffuse bilateral weakness, not cooperative with motor exam) present. Comments: Not cooperative with complete neurologic exam   Psychiatric:         Behavior: Behavior normal.        Procedures  EKG interpretation in absence of cardiology  EKG shows a normal sinus rhythm rate of 84, normal axis, no hypertrophy, nonspecific ST-T changes, no STEMI, large amount of artifact in 1 and aVL 3 and aVF and V5  Interpreted by ED physician Dr. Small Many  Results for orders placed or performed during the hospital encounter of 11/16/22   XR CHEST PORTABLE    Narrative    EXAM: XR CHEST PORTABLE    HISTORY: Shortness of breath, possible aspiration, nausea and vomiting. TECHNIQUE: Frontal chest.    COMPARISON: None available. FINDINGS:   The cardiac silhouette, mediastinum are within normal limits. There is mild pulmonary vascular prominence. This is best appreciated on the  right. There is no consolidation, pleural effusion, or pneumothorax. No significant osseous abnormalities are observed. Impression    There is mild pulmonary vascular prominence which may represent congestion. This  is best appreciated on the right. No consolidation or pleural effusion.      CT HEAD WO CONTRAST Narrative    EXAM: CT HEAD WO CONTRAST    HISTORY:  AMS, vomiting, history of seizures. TECHNIQUE: Axial images of the brain were performed without the administration  of intravenous contrast. Images were obtained axial plane and coronal  reformatted images were submitted. Dose reduction technique used: Automated exposure control/Adjustment of the mA  and/or kV according to patient size/Use of iterative reconstruction technique. COMPARISON: MRI from an outside facility dated 3/17/2022. FINDINGS:   There is no evidence of acute intracranial hemorrhage, midline shift, or mass  effect. No intra or extra-axial fluid collections are observed. There are mild chronic appearing microangiopathic changes within the  periventricular white matter. No evidence of acute confluent territorial  infarction. Ventricles and basal cisterns are patent and symmetric. Persistent ventricular  dilatation, not significantly changed. There is mild generalized volume loss. Visualized paranasal sinuses and mastoid air cells are without significant  fluid. Limited aeration of the mastoid air cells. Scalp, soft tissues, and calvarium are within normal limits. Impression    1. No CT evidence of acute intracranial process. 2. Persistent ventricular dilatation, not significantly changed. CT CHEST PULMONARY EMBOLISM W CONTRAST    Narrative    EXAM: CT angiogram chest.    HISTORY: dementia with elevated d dimer and troponin, immobility-PE? .      TECHNIQUE: CT angiographic images of the chest were obtained following  intravenous administration of contrast. Imaging was performed utilizing PE  protocol. Examination was performed in the axial plane and coronal  reconstructions were performed. 3-D MIPS reconstructions of the chest were  obtained. Dose reduction technique used: Automated exposure control/Adjustment of the mA  and/or kV according to patient size/Use of iterative reconstruction technique.     100 mL of Isovue-370 were utilized. COMPARISON: None. FINDINGS:   There is adequate opacification of the pulmonary arterial tree. No significant  filling defects are identified to suggest pulmonary embolism. Main pulmonary  artery is normal in caliber. The heart is not enlarged and there is no pericardial effusion. Aneurysmal  dilatation of the ascending aorta measuring up to 4.1 cm. There are no pathologically enlarged mediastinal hilar or axillary lymph nodes. Trachea and mainstem bronchi are patent. There is no consolidation, pleural  effusion, or pneumothorax. No nodules are seen. Chronic changes in the bilateral  apices. Mild basilar atelectasis on the right. The visualized upper abdomen is unremarkable. Osseous structures are within  normal limits. Impression    1. No evidence of pulmonary embolism. 2. No acute intrathoracic process.     3. Aneurysmal dilatation of the ascending aorta measuring up to 4.1 cm     CBC with Auto Differential   Result Value Ref Range    WBC 6.2 4.3 - 11.1 K/uL    RBC 4.39 4.05 - 5.2 M/uL    Hemoglobin 13.2 11.7 - 15.4 g/dL    Hematocrit 40.1 35.8 - 46.3 %    MCV 91.3 82 - 102 FL    MCH 30.1 26.1 - 32.9 PG    MCHC 32.9 31.4 - 35.0 g/dL    RDW 13.8 11.9 - 14.6 %    Platelets 265 796 - 016 K/uL    MPV 9.4 9.4 - 12.3 FL    nRBC 0.00 0.0 - 0.2 K/uL    Differential Type AUTOMATED      Seg Neutrophils 78 43 - 78 %    Lymphocytes 11 (L) 13 - 44 %    Monocytes 7 4.0 - 12.0 %    Eosinophils % 3 0.5 - 7.8 %    Basophils 1 0.0 - 2.0 %    Immature Granulocytes 0 0.0 - 5.0 %    Segs Absolute 4.9 1.7 - 8.2 K/UL    Absolute Lymph # 0.7 0.5 - 4.6 K/UL    Absolute Mono # 0.4 0.1 - 1.3 K/UL    Absolute Eos # 0.2 0.0 - 0.8 K/UL    Basophils Absolute 0.0 0.0 - 0.2 K/UL    Absolute Immature Granulocyte 0.0 0.0 - 0.5 K/UL   Comprehensive Metabolic Panel   Result Value Ref Range    Sodium 134 133 - 143 mmol/L    Potassium 4.1 3.5 - 5.1 mmol/L    Chloride 101 101 - 110 mmol/L    CO2 26 21 - 32 mmol/L    Anion Gap 7 2 - 11 mmol/L    Glucose 118 (H) 65 - 100 mg/dL    BUN 6 (L) 8 - 23 MG/DL    Creatinine 0.50 (L) 0.6 - 1.0 MG/DL    Est, Glom Filt Rate >60 >60 ml/min/1.73m2    Calcium 9.4 8.3 - 10.4 MG/DL    Total Bilirubin 0.5 0.2 - 1.1 MG/DL    ALT 24 12 - 65 U/L    AST 39 (H) 15 - 37 U/L    Alk Phosphatase 69 50 - 136 U/L    Total Protein 6.7 6.3 - 8.2 g/dL    Albumin 3.0 (L) 3.2 - 4.6 g/dL    Globulin 3.7 2.8 - 4.5 g/dL    Albumin/Globulin Ratio 0.8 0.4 - 1.6     Urinalysis   Result Value Ref Range    Color, UA YELLOW      Appearance CLOUDY      Specific Gravity, UA 1.020 1.001 - 1.023      pH, Urine 6.5 5.0 - 9.0      Protein, UA Negative NEG mg/dL    Glucose, UA Negative NEG mg/dL    Ketones, Urine 15 (A) NEG mg/dL    Bilirubin Urine Negative NEG      Blood, Urine Negative NEG      Urobilinogen, Urine 1.0 0.2 - 1.0 EU/dL    Nitrite, Urine Positive (A) NEG      Leukocyte Esterase, Urine TRACE (A) NEG      WBC, UA 20-50 0 /hpf    RBC, UA 3-5 0 /hpf    Epithelial Cells UA 5-10 0 /hpf    BACTERIA, URINE 4+ (H) 0 /hpf    Casts 3-5 0 /lpf    Mucus, UA 2+ (H) 0 /lpf    Other observations RESULTS VERIFIED MANUALLY     Lactic Acid   Result Value Ref Range    Lactic Acid, Plasma 1.0 0.4 - 2.0 MMOL/L   CK   Result Value Ref Range    Total  21 - 215 U/L   Lipase   Result Value Ref Range    Lipase 105 73 - 393 U/L   Brain Natriuretic Peptide   Result Value Ref Range    NT Pro- (H) 5 - 125 PG/ML   Troponin   Result Value Ref Range    Troponin, High Sensitivity 64.0 (H) 0 - 14 pg/mL   EKG 12 Lead   Result Value Ref Range    Ventricular Rate 84 BPM    Atrial Rate 85 BPM    QRS Duration 89 ms    Q-T Interval 343 ms    QTc Calculation (Bazett) 406 ms    R Axis 82 degrees    T Axis 56 degrees    Diagnosis Normal sinus rhythm         CT HEAD WO CONTRAST   Final Result   1. No CT evidence of acute intracranial process. 2. Persistent ventricular dilatation, not significantly changed.           CT CHEST PULMONARY EMBOLISM W CONTRAST   Final Result   1. No evidence of pulmonary embolism. 2. No acute intrathoracic process. 3. Aneurysmal dilatation of the ascending aorta measuring up to 4.1 cm         XR CHEST PORTABLE   Final Result   There is mild pulmonary vascular prominence which may represent congestion. This   is best appreciated on the right. No consolidation or pleural effusion. Voice dictation software was used during the making of this note. This software is not perfect and grammatical and other typographical errors may be present. This note has not been completely proofread for errors.      Claudia Jane MD  11/16/22 3405

## 2022-11-16 NOTE — H&P
Hospitalist History and Physical   Admit Date:  2022  3:18 AM   Name:  Purnima Garcia   Age:  67 y.o. Sex:  female  :  1950   MRN:  [id number]   Room:  03/    Presenting Complaint: Emesis     Reason(s) for Admission: Acute metabolic encephalopathy [C27.20]     History of Present Illness:   Purnima Garcia is a 67 y.o. female with medical history of NEVAEH on CPAP who presented via EMS from home with nausea and vomiting at coffee-ground substance associated with constipation and no stool output of 1 week duration. EMS reported patient had a fever 102.4. Of note patient follows neurology Dr. Imer Mosqueda and per note 22, progressive aphasia has been worse since  associated with frontotemporal lobe dementia. At some point she stayed at a nursing home/assisted living but now she lives with her  and son with total assist, diffuse difficulties communicating and ambulating. Per neurology's note 2022, reinitiate Aricept 10 mg nightly, continue Namenda 10 mg twice daily, Klonopin 0.25 mg twice daily. Off of Lamictal due to sedation. Keppra at current dose so low that is not treating her. On mainly vitamins and oils at home. In ED, CBC and CMP unremarkable. UA c/o UTI. Pt was started on Rocephin. Family at bedside on admission. Review of Systems:  10 systems reviewed and negative except as noted in HPI. Assessment & Plan:     Acute UTI  Abx: Rocephin (-. ..)  Ucx: pending  Obtain renal US to r/o obstruction    Progressive aphasia  Severe frontal temporal lobe dementia  History of seizure disorder  Patient follows neurology Dr. Imer Mosqueda and per note, aphasia has been worse since  associated with frontotemporal lobe dementia. At baseline, she lives with her  and son with total assist, diffuse difficulties communicating and ambulating. Nonverbal mostly and not following much commands.   Per neurology's note 2022, reinitiate Aricept 10 mg nightly, continue Namenda 10 mg twice daily, Klonopin 0.25 mg twice daily. Off of Lamictal due to sedation. Keppra at current dose so low that is not treating her. On mainly vitamins and oils at home  CT head on admission shows chronic findings  Continue supportive care  Cont home Klonopin, Keppra, Klonopin, Namenda--but holding for now d/t NPO  Limit sedating meds  Delirium precautions  PT/OT  SLP eval--NPO recommended for now, re-evaluate tomorrow    Nausea and vomiting, resolved  Reported coffee ground. Hgb 13.2 on admission. Could be 2/2 gastritis. KUB w/o obstruction. CT chest was negative for PE; no acute process  Antiemetics prn  Start PPI IV  Hold off on IVF as CXR shows mild vascular congestion  Monitor for improvement     Constipation  KUB obtained on admission w/o obstruction  Start Miralax daily and Dulcolax suppository daily  Monitor BM    Ascending Aorta Aneurysm  Incidentally captured on CT chest measuring up to 4.1cm on admission  CTM outpatient, needs vascular surgery referral for continued surveillance    Hypothyroidism  Cont home Synthroid     NEVAEH  Continue home CPAP nightly    Management of Delirium      Non-pharmacological intervention  Reorient the patient throughout the day  Window open and lights on during the day. Lights off, television off, noises down during the night. If able, decrease nursing checks at night  Therapies as often as possible  Avoid restraints to the best of your ability   Avoid sensory deprivation by using glasses and hearing aids, if applicable        Pharmacological intervention  Replete electrolyte abnormalities and correct metabolic abnormalities  Limit benzodiazepines, antihistamines, narcotics, anticholinergics. Preference towards Precedex for sedation over fentanyl and benzodiazepines/GABAa agonists.    For dangerous behavior/aggression to self or others can consider Zyprexa or Seroquel if benefits outweigh risk  For persistent insomnia can use melatonin four hours prior to bedtime or Seroquel 25 mg at bedtime         Anticipated discharge needs:     >2 midnights pending cultures    Diet: No diet orders on file  VTE ppx: SCD's  Code status: No Order    Hospital Problems:  Principal Problem:    Acute metabolic encephalopathy  Resolved Problems:    * No resolved hospital problems.  *       Past History:     Past Medical History:   Diagnosis Date    Fibroid, uterine     Other ill-defined conditions(799.89)     Hypothyroidism    Psychiatric disorder     Depression       Past Surgical History:   Procedure Laterality Date    ADENOIDECTOMY      APPENDECTOMY      GYN      Partial Hysterectomy    TONSILLECTOMY          Social History     Tobacco Use    Smoking status: Never    Smokeless tobacco: Never   Substance Use Topics    Alcohol use: Yes      Social History     Substance and Sexual Activity   Drug Use No       Family History   Problem Relation Age of Onset    Cancer Mother     Heart Disease Father         Immunization History   Administered Date(s) Administered    COVID-19, PFIZER PURPLE top, DILUTE for use, (age 15 y+), 30mcg/0.3mL 08/27/2021     Allergies   Allergen Reactions    Sulfa Antibiotics Other (See Comments)     Hematuria    Wasp Venom Protein Hives and Swelling    Penicillins Rash     Prior to Admit Medications:  Current Outpatient Medications   Medication Instructions    citalopram (CELEXA) 20 MG tablet 1 tablet, Oral, DAILY    clonazePAM (KLONOPIN) 0.5 mg, Oral, 2 TIMES DAILY PRN    lamoTRIgine (LAMICTAL) 25 MG tablet Take 1 bid for one month than 2 bid    levETIRAcetam (KEPPRA) 250 mg, Oral, 2 TIMES DAILY    levothyroxine (SYNTHROID) 75 mcg, Oral, DAILY    memantine (NAMENDA) 10 mg, Oral, 2 TIMES DAILY    mirtazapine (REMERON) 15 mg, Oral, DAILY         Objective:   Patient Vitals for the past 24 hrs:   Temp Pulse Resp BP SpO2   11/16/22 0629 -- 71 14 109/70 94 %   11/16/22 0604 -- 82 15 127/71 94 %   11/16/22 0529 -- 81 16 114/81 95 %   11/16/22 0514 -- 79 15 125/79 95 %   11/16/22 0449 -- 80 19 120/79 94 %   11/16/22 0429 -- 80 16 125/76 94 %   11/16/22 0419 -- 80 18 135/82 96 %   11/16/22 0359 -- 82 15 133/80 95 %   11/16/22 0349 -- 81 19 (!) 132/90 --   11/16/22 0345 99.7 °F (37.6 °C) -- -- -- --   11/16/22 0322 98.5 °F (36.9 °C) 87 17 123/84 98 %       Oxygen Therapy  SpO2: 94 %    Estimated body mass index is 21.79 kg/m² as calculated from the following:    Height as of 9/27/22: 5' 6\" (1.676 m). Weight as of 9/27/22: 135 lb (61.2 kg). No intake or output data in the 24 hours ending 11/16/22 0736      Physical Exam:    Blood pressure 109/70, pulse 71, temperature 99.7 °F (37.6 °C), temperature source Rectal, resp. rate 14, SpO2 94 %. General:    Appears older than stated age. No acute distress. Nonverbal  Head:  Normocephalic, atraumatic  Eyes:  Sclerae appear normal.  Pupils equally round. ENT:  Nares appear normal, no drainage. Moist oral mucosa  Neck:  No restricted ROM. Trachea midline   CV:   RRR. No m/r/g. No jugular venous distension. Lungs:   Congested cough sounds. No wheezing, rhonchi, or rales. Symmetric expansion. Abdomen: Bowel sounds present. Soft, nontender, nondistended. Extremities: No cyanosis or clubbing. No edema  Skin:     No rashes and normal coloration. Warm and dry. Neuro:  Unable to examine. Non-verbal. Follows basic commands. Psych:  Normal mood and affect.       I have personally reviewed labs and tests showing:  Recent Labs:  Recent Results (from the past 24 hour(s))   CBC with Auto Differential    Collection Time: 11/16/22  3:45 AM   Result Value Ref Range    WBC 6.2 4.3 - 11.1 K/uL    RBC 4.39 4.05 - 5.2 M/uL    Hemoglobin 13.2 11.7 - 15.4 g/dL    Hematocrit 40.1 35.8 - 46.3 %    MCV 91.3 82 - 102 FL    MCH 30.1 26.1 - 32.9 PG    MCHC 32.9 31.4 - 35.0 g/dL    RDW 13.8 11.9 - 14.6 %    Platelets 617 745 - 253 K/uL    MPV 9.4 9.4 - 12.3 FL    nRBC 0.00 0.0 - 0.2 K/uL    Differential Type AUTOMATED      Seg Neutrophils 78 43 - 78 % Lymphocytes 11 (L) 13 - 44 %    Monocytes 7 4.0 - 12.0 %    Eosinophils % 3 0.5 - 7.8 %    Basophils 1 0.0 - 2.0 %    Immature Granulocytes 0 0.0 - 5.0 %    Segs Absolute 4.9 1.7 - 8.2 K/UL    Absolute Lymph # 0.7 0.5 - 4.6 K/UL    Absolute Mono # 0.4 0.1 - 1.3 K/UL    Absolute Eos # 0.2 0.0 - 0.8 K/UL    Basophils Absolute 0.0 0.0 - 0.2 K/UL    Absolute Immature Granulocyte 0.0 0.0 - 0.5 K/UL   Comprehensive Metabolic Panel    Collection Time: 11/16/22  3:45 AM   Result Value Ref Range    Sodium 134 133 - 143 mmol/L    Potassium 4.1 3.5 - 5.1 mmol/L    Chloride 101 101 - 110 mmol/L    CO2 26 21 - 32 mmol/L    Anion Gap 7 2 - 11 mmol/L    Glucose 118 (H) 65 - 100 mg/dL    BUN 6 (L) 8 - 23 MG/DL    Creatinine 0.50 (L) 0.6 - 1.0 MG/DL    Est, Glom Filt Rate >60 >60 ml/min/1.73m2    Calcium 9.4 8.3 - 10.4 MG/DL    Total Bilirubin 0.5 0.2 - 1.1 MG/DL    ALT 24 12 - 65 U/L    AST 39 (H) 15 - 37 U/L    Alk Phosphatase 69 50 - 136 U/L    Total Protein 6.7 6.3 - 8.2 g/dL    Albumin 3.0 (L) 3.2 - 4.6 g/dL    Globulin 3.7 2.8 - 4.5 g/dL    Albumin/Globulin Ratio 0.8 0.4 - 1.6     CK    Collection Time: 11/16/22  3:45 AM   Result Value Ref Range    Total  21 - 215 U/L   Lipase    Collection Time: 11/16/22  3:45 AM   Result Value Ref Range    Lipase 105 73 - 393 U/L   Brain Natriuretic Peptide    Collection Time: 11/16/22  3:45 AM   Result Value Ref Range    NT Pro- (H) 5 - 125 PG/ML   Troponin    Collection Time: 11/16/22  3:45 AM   Result Value Ref Range    Troponin, High Sensitivity 64.0 (H) 0 - 14 pg/mL   Lactic Acid    Collection Time: 11/16/22  3:47 AM   Result Value Ref Range    Lactic Acid, Plasma 1.0 0.4 - 2.0 MMOL/L   Urinalysis    Collection Time: 11/16/22  3:57 AM   Result Value Ref Range    Color, UA YELLOW      Appearance CLOUDY      Specific Gravity, UA 1.020 1.001 - 1.023      pH, Urine 6.5 5.0 - 9.0      Protein, UA Negative NEG mg/dL    Glucose, UA Negative NEG mg/dL    Ketones, Urine 15 (A) NEG mg/dL    Bilirubin Urine Negative NEG      Blood, Urine Negative NEG      Urobilinogen, Urine 1.0 0.2 - 1.0 EU/dL    Nitrite, Urine Positive (A) NEG      Leukocyte Esterase, Urine TRACE (A) NEG      WBC, UA 20-50 0 /hpf    RBC, UA 3-5 0 /hpf    Epithelial Cells UA 5-10 0 /hpf    BACTERIA, URINE 4+ (H) 0 /hpf    Casts 3-5 0 /lpf    Mucus, UA 2+ (H) 0 /lpf    Other observations RESULTS VERIFIED MANUALLY     EKG 12 Lead    Collection Time: 11/16/22  5:04 AM   Result Value Ref Range    Ventricular Rate 84 BPM    Atrial Rate 85 BPM    QRS Duration 89 ms    Q-T Interval 343 ms    QTc Calculation (Bazett) 406 ms    R Axis 82 degrees    T Axis 56 degrees    Diagnosis Normal sinus rhythm    Troponin    Collection Time: 11/16/22  6:12 AM   Result Value Ref Range    Troponin, High Sensitivity 62.0 (H) 0 - 14 pg/mL       I have personally reviewed imaging studies showing:  CT HEAD WO CONTRAST    Result Date: 11/16/2022  EXAM: CT HEAD WO CONTRAST HISTORY:  AMS, vomiting, history of seizures. TECHNIQUE: Axial images of the brain were performed without the administration of intravenous contrast. Images were obtained axial plane and coronal reformatted images were submitted. Dose reduction technique used: Automated exposure control/Adjustment of the mA and/or kV according to patient size/Use of iterative reconstruction technique. COMPARISON: MRI from an outside facility dated 3/17/2022. FINDINGS: There is no evidence of acute intracranial hemorrhage, midline shift, or mass effect. No intra or extra-axial fluid collections are observed. There are mild chronic appearing microangiopathic changes within the periventricular white matter. No evidence of acute confluent territorial infarction. Ventricles and basal cisterns are patent and symmetric. Persistent ventricular dilatation, not significantly changed. There is mild generalized volume loss. Visualized paranasal sinuses and mastoid air cells are without significant fluid.  Limited aeration of the mastoid air cells. Scalp, soft tissues, and calvarium are within normal limits. 1. No CT evidence of acute intracranial process. 2. Persistent ventricular dilatation, not significantly changed. XR CHEST PORTABLE    Result Date: 11/16/2022  EXAM: XR CHEST PORTABLE HISTORY: Shortness of breath, possible aspiration, nausea and vomiting. TECHNIQUE: Frontal chest. COMPARISON: None available. FINDINGS: The cardiac silhouette, mediastinum are within normal limits. There is mild pulmonary vascular prominence. This is best appreciated on the right. There is no consolidation, pleural effusion, or pneumothorax. No significant osseous abnormalities are observed. There is mild pulmonary vascular prominence which may represent congestion. This is best appreciated on the right. No consolidation or pleural effusion. CT CHEST PULMONARY EMBOLISM W CONTRAST    Result Date: 11/16/2022  EXAM: CT angiogram chest. HISTORY: dementia with elevated d dimer and troponin, immobility-PE? .  TECHNIQUE: CT angiographic images of the chest were obtained following intravenous administration of contrast. Imaging was performed utilizing PE protocol. Examination was performed in the axial plane and coronal reconstructions were performed. 3-D MIPS reconstructions of the chest were obtained. Dose reduction technique used: Automated exposure control/Adjustment of the mA and/or kV according to patient size/Use of iterative reconstruction technique. 100 mL of Isovue-370 were utilized. COMPARISON: None. FINDINGS: There is adequate opacification of the pulmonary arterial tree. No significant filling defects are identified to suggest pulmonary embolism. Main pulmonary artery is normal in caliber. The heart is not enlarged and there is no pericardial effusion. Aneurysmal dilatation of the ascending aorta measuring up to 4.1 cm. There are no pathologically enlarged mediastinal hilar or axillary lymph nodes.  Trachea and mainstem bronchi are patent. There is no consolidation, pleural effusion, or pneumothorax. No nodules are seen. Chronic changes in the bilateral apices. Mild basilar atelectasis on the right. The visualized upper abdomen is unremarkable. Osseous structures are within normal limits. 1. No evidence of pulmonary embolism. 2. No acute intrathoracic process. 3. Aneurysmal dilatation of the ascending aorta measuring up to 4.1 cm       Echocardiogram:  No results found for this or any previous visit. Orders Placed This Encounter   Medications    lactated ringers infusion    iopamidol (ISOVUE-370) 76 % injection 100 mL    cefTRIAXone (ROCEPHIN) 1,000 mg in sodium chloride 0.9 % 50 mL IVPB mini-bag     Order Specific Question:   Antimicrobial Indications     Answer:   Urinary Tract Infection         Signed: Miesha Salamanca DO    Part of this note may have been written by using a voice dictation software. The note has been proof read but may still contain some grammatical/other typographical errors.

## 2022-11-16 NOTE — PROGRESS NOTES
LTG: Patient will tolerate safest, least restrictive oral diet without s/sx airway compromise  STG: Patient will tolerate ongoing po trials in efforts to advance diet as appropriate   STG: Patient will participate in modified barium swallow study to objectively assess oropharyngeal swallow if indicated      SPEECH LANGUAGE PATHOLOGY: DYSPHAGIA  Initial Assessment    NAME: Antony Roa  : 1950  MRN: [id number]    ADMISSION DATE: 2022  PRIMARY DIAGNOSIS: Acute metabolic encephalopathy  Acute metabolic encephalopathy [E71.00]    ICD-10: Treatment Diagnosis: R13.12 Dysphagia, Oropharyngeal Phase    RECOMMENDATIONS   Diet:  Diet Solids Recommendation: NPO  Liquid Consistency Recommendation: NPO    Medications: Other (defer to MD)     Recommendations: Therapeutic feeds with SLP only    Therapeutic Interventions: Patient/Family education; Therapeutic PO trials with SLP;Oral care   Compensatory Swallowing Strategies:  n/a   Patient continues to require skilled intervention: Yes  D/C Recommendations: Ongoing speech therapy is recommended during this hospitalization, Ongoing speech therapy is recommended at next level of care     ASSESSMENT    Dysphagia Diagnosis: Moderate oral stage dysphagia; Moderate pharyngeal stage dysphagia; Suspected needs further assessment  Patient demonstrates baseline congestion. she has weak/ineffective cough. Patient at risk of aspiration given vomiting episodes and difficulty with clearance. Limited evaluation of oropharyngeal swallow this date. Recommend NPO. Will re-evaluate oropharyngeal swallow tomorrow. GENERAL    History of Present Injury/Illness: Ms. Carmenza Vance  has a past medical history of Fibroid, uterine, Other ill-defined conditions(799.89), and Psychiatric disorder. . She also  has a past surgical history that includes Adenoidectomy; Appendectomy; gyn; and Tonsillectomy.     Chart Reviewed: Yes  General Comment  Comments: pt admitted with nausea/vomiting and encephalopathy  Subjective: daughter at bedside. pt asleep but roused easily. minimally verbal.  Communication Observation:  (followed by outpatient neurology who reports symptoms consistent with primary progressive aphasia)  Inconsistent verbal responses \"yeah\" or \"no\"; does not consistently respond. Poor command following. Prior Dysphagia History: per daughter, patient eats sofer solids and family cuts into bite sized pieces. Current Liquid Diet : Thin, Clear    Respiratory Status: Room air              Pain:   Patient does not appear in pain                                        OBJECTIVE    Patient Positioning: Upright in bed   Oral Motor   Labial: No impairment  Oral Hygiene: Clean  Lingual: No impairment  Dentition: Adequate    Baseline Vocal Quality: Weak    Oropharyngeal Phase:  Patient with audible congestion prior to trials. No oral intake today. Weak ineffective clearance. Not able to follow commands. Unable to bring secretions into oral cavity for suctioning. Presented small ice chip and patient demonstrates functional swallow upon palpation; however, increased congestion and difficulty clearing with weak cough/throat clear. Further trials deferred due to impaired secretion management and increased risk of aspiration. Also concerns of tolerance given recent vomiting episodes/GI issues. PLAN    Duration/Frequency: Continue to follow patient 3x/week for duration of hospitalization and/or until goals met    Dysphagia Outcome and Severity Scale (ANICETO)  Dysphagia Outcome Severity Scale: Level 3: Moderate dysphagia- Total assisstance, supervision or strategies.  Two or more diet consistencies restricted  Interpretation of Tool: The Dysphagia Outcome and Severity Scale (ANICETO) is a simple, easy-to-use, 7-point scale developed to systematically rate the functional severity of dysphagia based on objective assessment and make recommendations for diet level, independence level, and type of nutrition. Normal(7), Functional(6), Mild(5), Mild-Moderate(4), Moderate(3), Moderate-Severe(2), Severe(1)    Speech Therapy Prognosis  Prognosis: Good    Education: Patient, Family member, RN; Dr. Ousmane Panda notified via [facility]. Patient Education: discussed with patient's daughter:  NPO status, aspiration concerns. daughter expressed understanding.   Patient Education Response: Verbalizes understanding, Needs reinforcement    PRECAUTIONS/ALLERGIES: Sulfa antibiotics, Wasp venom protein, and Penicillins   Safety Devices in place: Yes  Type of devices: Call light within reach, Nurse notified (daughter at bedside)    Therapy Time  SLP Individual Minutes  Time In: 5556  Time Out: 1886  Minutes: 1950 [place], [place]  11/16/2022 1:18 PM

## 2022-11-16 NOTE — PROGRESS NOTES
TRANSFER - IN REPORT:    Verbal report received from LESLIE Asif on [employer]  being received from ED for routine progression of patient care      Report consisted of patient's Situation, Background, Assessment and   Recommendations(SBAR). Information from the following report(s) Nurse Handoff Report, Intake/Output, MAR, and Recent Results was reviewed with the receiving nurse. Opportunity for questions and clarification was provided. Assessment completed upon patient's arrival to unit and care assumed.

## 2022-11-16 NOTE — PROGRESS NOTES
ACUTE PHYSICAL THERAPY GOALS:   (Developed with and agreed upon by patient and/or caregiver. )  LTG:  (1.)Ms. Jian Feldman will move from supine to sit and sit to supine , scoot up and down, and roll side to side in bed with MODERATE ASSIST within 7 treatment day(s). (2.)Ms. Jian Feldman will transfer from bed to chair and chair to bed with MODERATE ASSIST x2 using the least restrictive device within 7 treatment day(s). (3.)Ms. Jian Felmdan will ambulate with MAX ASSISTx2 for 50 feet with the least restrictive device within 7 treatment day(s). ________________________________________________________________________________________________     PHYSICAL THERAPY Initial Assessment and Daily Note  (Link to Caseload Tracking: PT Visit Days : 1  Acknowledge Orders  Time In/Out  PT Charge Capture  Rehab Caseload Tracker    Amari Herman is a 67 y.o. female   PRIMARY DIAGNOSIS: Acute UTI  Mild dehydration [E86.0]  Primary progressive aphasia ([address] [G31.01, F02.80]  Acute cystitis without hematuria [U37.28]  Acute metabolic encephalopathy [D51.87]  Dysphagia, unspecified type [R13.10]  Nausea and vomiting, unspecified vomiting type [R11.2]       Reason for Referral: Generalized Muscle Weakness (M62.81)  Difficulty in walking, Not elsewhere classified (R26.2)  Other abnormalities of gait and mobility (R26.89)  Inpatient: Payor: Spring Ayala / Plan: Spring Ayala / Product Type: *No Product type* /     ASSESSMENT:     REHAB RECOMMENDATIONS:   Recommendation to date pending progress:  [address] [place] [address], return home with assistance of family    Equipment:    To Be Determined     ASSESSMENT:  Ms. Jian Feldman presents with n/v and baseline severe aphasia and dementia. She has been living at home with her  and son who assist her will all mobility and daughters who come and assist with bathing twice a week. Up until two weeks ago she was ambulating with assistance within her home and standing up with use of grab bars in the shower.  Pt with decreased strength, mobility and overall activity tolerance compared with her baseline. She recently had HHPT come in and family was provided with education for transfers and care. Per her daughter she functions better in the afternoons and is more likely to participate after lunch. Pt would continue to benefit from skilled acute care PT to facilitate improvements in the above stated deficits and promote return to baseline function. HHPT recommended with return home with 24/7 care. [facility] [address] [address] AM-PAC 6 Clicks Basic Mobility Inpatient Short Form  AM-PAC Mobility Inpatient   How much difficulty turning over in bed?: A Lot  How much difficulty sitting down on / standing up from a chair with arms?: Unable  How much difficulty moving from lying on back to sitting on side of bed?: Unable  How much help from another person moving to and from a bed to a chair?: Total  How much help from another person needed to walk in hospital room?: Total  How much help from another person for climbing 3-5 steps with a railing?: Total  AM-PAC Inpatient Mobility Raw Score : 7  AM-PAC Inpatient T-Scale Score : 26.42  Mobility Inpatient CMS 0-100% Score: 92.36  Mobility Inpatient CMS G-Code Modifier : CM    SUBJECTIVE:   Ms. Stephanie Bailey has severe aphasia but smiles and nods throughout.     Social/Functional Lives With: Spouse, Son  Type of Home: House  Receives Help From: Family  ADL Assistance: Needs assistance  Ambulation Assistance: Needs assistance  Transfer Assistance: Needs assistance    OBJECTIVE:     PAIN: VITALS / O2: PRECAUTION / Maye Garre / DRAINS:   Pre Treatment:   Pain Assessment: Adult Nonverbal Pain Scale (NPVS)      Post Treatment: No signs of pain post mobility Vitals        Oxygen      IV and Purewick    RESTRICTIONS/PRECAUTIONS:                    GROSS EVALUATION: Intact Impaired (Comments):   AROM []  Unable to follow commands at time of attempt   PROM [] Resisting mobility, decreased ROM   Strength [] Generalized muscle weakness in BLE   Balance []  Unable to assess due to minimal mobility   Posture [] N/A   Sensation []     Coordination []      Tone []     Edema []    Activity Tolerance []  Tolerates minimal mobility    []      COGNITION/  PERCEPTION: Intact Impaired (Comments):   Orientation []  Nonverbal, unable to answer questions   Vision []     Hearing []  Unable to assess   Cognition  []  Unable to assess, significant aphasia and dementia     MOBILITY: I Mod I S SBA CGA Min Mod Max Total  NT x2 Comments:   Bed Mobility    Rolling [] [] [] [] [] [] [] [] [x] [] [x]    Supine to Sit [] [] [] [] [] [] [] [] [] [x] []    Scooting [] [] [] [] [] [] [] [] [x] [] [x]    Sit to Supine [] [] [] [] [] [] [] [] [] [x] []    Transfers    Sit to Stand [] [] [] [] [] [] [] [] [] [x] []    Bed to Chair [] [] [] [] [] [] [] [] [] [x] []    Stand to Sit [] [] [] [] [] [] [] [] [] [x] []     [] [] [] [] [] [] [] [] [] [x] []    I=Independent, Mod I=Modified Independent, S=Supervision, SBA=Standby Assistance, CGA=Contact Guard Assistance,   Min=Minimal Assistance, Mod=Moderate Assistance, Max=Maximal Assistance, Total=Total Assistance, NT=Not Tested    GAIT: I Mod I S SBA CGA Min Mod Max Total  NT x2 Comments:   Level of Assistance [] [] [] [] [] [] [] [] [] [x] []    Distance   feet    DME N/A    Gait Quality N/A    Weightbearing Status      Stairs      I=Independent, Mod I=Modified Independent, S=Supervision, SBA=Standby Assistance, CGA=Contact Guard Assistance,   Min=Minimal Assistance, Mod=Moderate Assistance, Max=Maximal Assistance, Total=Total Assistance, NT=Not Tested    PLAN:   FREQUENCY AND DURATION: 3 times/week for duration of hospital stay or until stated goals are met, whichever comes first.    THERAPY PROGNOSIS: Guarded    PROBLEM LIST:   (Skilled intervention is medically necessary to address:)  Decreased ADL/Functional Activities  Decreased Activity Tolerance  Decreased AROM/PROM  Decreased Balance  Decreased Coordination  Decreased Gait Ability  Decreased Safety Awareness  Decreased Strength  Decreased Transfer Abilities  Increased Pain INTERVENTIONS PLANNED:   (Benefits and precautions of physical therapy have been discussed with the patient.)  Self Care Training  Therapeutic Activity  Therapeutic Exercise/HEP  Neuromuscular Re-education  Gait Training       TREATMENT:   EVALUATION: HIGH COMPLEXITY: (Untimed Charge)    TREATMENT:   Co-Treatment PT/OT necessary due to patient's decreased overall endurance/tolerance levels, as well as need for high level skilled assistance to complete functional transfers/mobility and functional tasks  Therapeutic Activity (15 Minutes): Therapeutic activity included Rolling to improve functional Activity tolerance, Coordination, and Mobility. TREATMENT GRID:  N/A    AFTER TREATMENT PRECAUTIONS: Alarm Activated, Bed, Bed/Chair Locked, Call light within reach, Restraints , and Visitors at bedside    INTERDISCIPLINARY COLLABORATION:  RN/ PCT, PT/ PTA, and OT/ CERVANTES    EDUCATION: Education Given To: Patient; Family  Education Provided: Role of Therapy;Plan of Care;Family Education  Education Method: Verbal  Barriers to Learning: Cognition  Education Outcome: Verbalized understanding;Continued education needed (daughter verbalized understanding)    TIME IN/OUT:  Time In: 2744  Time Out: [address] [place] [address]   Minutes: Kate [address] [place] [address]

## 2022-11-16 NOTE — PROGRESS NOTES
ACUTE OCCUPATIONAL THERAPY GOALS:   (Developed with and agreed upon by patient and/or caregiver.)  1. Patient will complete upper body bathing and dressing with MINIMAL ASSIST and adaptive equipment as needed. 2. Patient will complete self-grooming tasks in unsupported sitting with MINIMAL ASSIST and adaptive equipment as needed. 3. Patient will tolerate 25 minutes of OT treatment with 1-2 rest breaks to increase activity tolerance for ADLs. 4. Patient will complete functional transfers with MODERATE ASSIST X 2 and adaptive equipment as needed. Timeframe: 7 visits     OCCUPATIONAL THERAPY Initial Assessment and Daily Note       OT Visit Days: 1  Acknowledge Orders  Time  OT Charge Capture  Rehab Caseload Tracker      Amari Scales is a 67 y.o. female   PRIMARY DIAGNOSIS: Acute UTI  Mild dehydration [E86.0]  Primary progressive aphasia ([address] [G31.01, F02.80]  Acute cystitis without hematuria [E37.03]  Acute metabolic encephalopathy [L92.32]  Dysphagia, unspecified type [R13.10]  Nausea and vomiting, unspecified vomiting type [R11.2]       Reason for Referral: Generalized Muscle Weakness (M62.81)  Difficulty in walking, Not elsewhere classified (R26.2)  Other abnormalities of gait and mobility (R26.89)  Inpatient: Payor: Jorge Alberto Richard / Plan: Jorge Alberto Richard / Product Type: *No Product type* /     ASSESSMENT:     REHAB RECOMMENDATIONS:   Recommendation to date pending progress:  [address] [place] [address], return home with assist of family    Equipment:    To Be Determined     ASSESSMENT:  Ms. Pj Beyer is a 66 y/o female who presents with nausea and vomiting and baseline serve aphasia and dementia. At baseline pt  has been living at home with her  and son who assist with all mobility and ADLs. They have recently set up [facility] services for PT and ST. Her daughters come in 2x/week to assist with bathing which daughter states that they are typically able to transfer into the shower.  Up until two weeks ago she was ambulating with assistance within her home. Up until August of this year, her daughter reports that she was able to go for walks in the neighborhood with her . She does not use any AD with ambulation. This date, pt was total A for rolling and was resistive towards movement. Today pt presents with decreased activity tolerance, strength, and functional mobility impacting ADLs. Pt is currently functioning below baseline and would benefit from skilled OT services to address OT goals and plan of care. Rec HHOT and that they resume family care at d/c. However, if family decides that they no longer are able to care for pt then d/c recommendation can be modified. [address] [facility] [address] [address] [facility] [facility]licks Daily Activity Inpatient Short Form:    AM-PAC Daily Activity Inpatient   How much help for putting on and taking off regular lower body clothing?: Total  How much help for Bathing?: Total  How much help for Toileting?: Total  How much help for putting on and taking off regular upper body clothing?: Total  How much help for taking care of personal grooming?: Total  How much help for eating meals?: Total  AM-PAC Inpatient Daily Activity Raw Score: 6  AM-PAC Inpatient ADL T-Scale Score : 17.07  ADL Inpatient CMS 0-100% Score: 100  ADL Inpatient CMS G-Code Modifier : CN           SUBJECTIVE:     Ms. April Victor did not speak during session, she suffers from aphasia.  Will answer yes/no questions    Social/Functional Lives With: Spouse, Son  Type of Home: House  Receives Help From: Family  ADL Assistance: Needs assistance  Ambulation Assistance: Needs assistance  Transfer Assistance: Needs assistance    OBJECTIVE:     LINES / Cole Piper / Donis Counter: IV and Purewick    RESTRICTIONS/PRECAUTIONS:       PAIN: Roselie Cancer / O2:   Pre Treatment:    Did not c/o pain      Post Treatment: did not c/o pain       Vitals          Oxygen            GROSS EVALUATION: INTACT IMPAIRED   (See Comments)   UE AROM [] []Pain with movement in R shoulder   UE PROM [] []   Strength []  Generalized weakness     Posture / Balance []  Unable to assess due to minimal mobility   Sensation []     Coordination []       Tone []       Edema []    Activity Tolerance []  Decreased, resistance towards mobility this date     Hand Dominance R [] L []      COGNITION/  PERCEPTION: INTACT IMPAIRED   (See Comments)   Orientation []  Nonverbal   Vision []  Unable to assess formally due to aphasia   Hearing []  Unable to assess formally due to aphasia   Cognition  []  Baseline dementia   Perception []       MOBILITY: I Mod I S SBA CGA Min Mod Max Total  NT x2 Comments:   Bed Mobility    Rolling [] [] [] [] [] [] [] [] [x] [] [x]    Supine to Sit [] [] [] [] [] [] [] [] [] [] []    Scooting [] [] [] [] [] [] [] [] [x] [] [x] Towards HOB   Sit to Supine [] [] [] [] [] [] [] [] [] [] []    Transfers    Sit to Stand [] [] [] [] [] [] [] [] [] [] []    Bed to Chair [] [] [] [] [] [] [] [] [] [] []    Stand to Sit [] [] [] [] [] [] [] [] [] [] []    Tub/Shower [] [] [] [] [] [] [] [] [] [] []     Toilet [] [] [] [] [] [] [] [] [] [] []      [] [] [] [] [] [] [] [] [] [] []    I=Independent, Mod I=Modified Independent, S=Supervision/Setup, SBA=Standby Assistance, CGA=Contact Guard Assistance, Min=Minimal Assistance, Mod=Moderate Assistance, Max=Maximal Assistance, Total=Total Assistance, NT=Not Tested    ACTIVITIES OF DAILY LIVING: I Mod I S SBA CGA Min Mod Max Total NT Comments   BASIC ADLs:              Upper Body Bathing  [] [] [] [] [] [] [] [] [] []    Lower Body Bathing [] [] [] [] [] [] [] [] [] []    Toileting [] [] [] [] [] [] [] [] [] []    Upper Body Dressing [] [] [] [] [] [] [] [] [] []    Lower Body Dressing [] [] [] [] [] [] [] [] [] []    Feeding [] [] [] [] [] [] [] [] [] []    Grooming [] [] [] [] [] [] [] [] [] []    Personal Device Care [] [] [] [] [] [] [] [] [] []    Functional Mobility [] [] [] [] [] [] [] [] [] []    I=Independent, Mod I=Modified Independent, S=Supervision/Setup, SBA=Standby Assistance, CGA=Contact [address], Min=Minimal Assistance, Mod=Moderate Assistance, Max=Maximal Assistance, Total=Total Assistance, NT=Not Tested    PLAN:   [facility] of Care: 3 times/week for duration of hospital stay or until stated goals are met, whichever comes first.    PROBLEM LIST:   (Skilled intervention is medically necessary to address:)  Decreased ADL/Functional Activities  Decreased Activity Tolerance  Decreased AROM/PROM  Decreased Balance  Decreased Cognition  Decreased Coordination  Decreased Gait Ability  Decreased Safety Awareness  Decreased Strength  Decreased Transfer Abilities   INTERVENTIONS PLANNED:  (Benefits and precautions of occupational therapy have been discussed with the patient.)  Self Care Training  Therapeutic Activity  Therapeutic Exercise/HEP  Neuromuscular Re-education  Manual Therapy  Education         TREATMENT:     EVALUATION: MODERATE COMPLEXITY: (Untimed Charge)    TREATMENT:   Co-Treatment PT/OT necessary due to patient's decreased overall endurance/tolerance levels, as well as need for high level skilled assistance to complete functional transfers/mobility and functional tasks  Neuromuscular Re-education (15 Minutes): Neuromuscular Re-education included functional bed mobility with facilitation to improve Functional Mobility. TREATMENT GRID:  N/A    AFTER TREATMENT PRECAUTIONS: Alarm Activated, Bed, Bed/Chair Locked, Call light within reach, Needs within reach, RN notified, and Visitors at bedside    INTERDISCIPLINARY COLLABORATION:  RN/ PCT and PT/ PTA    EDUCATION:  Education Given To: Patient; Family  Education Provided: Role of Therapy;Plan of Care;Family Education  Education Method: Verbal  Barriers to Learning: Cognition  Education Outcome: Continued education needed    TOTAL TREATMENT DURATION AND TIME:  Time In: 7212  Time Out: [address] [place] [address]  Minutes: [address] [place], [place]

## 2022-11-16 NOTE — ED TRIAGE NOTES
Patient arrives via EMS from home. Patient started throwing up dark brown coffee ground like fluid 30 min ago. Has not passed stool for 1 week. No history of GI problems. EMS states patient has a fever at 102.4 axillary. Family attempting to change mediation regimen to stop taking benzodiazepines. History of seizures.  Patient does not take blood thinners

## 2022-11-17 ENCOUNTER — APPOINTMENT (OUTPATIENT)
Dept: GENERAL RADIOLOGY | Age: 72
DRG: 689 | End: 2022-11-17
Payer: MEDICARE

## 2022-11-17 LAB
ACCESSION NUMBER, LLC1M: ABNORMAL
ACINETOBACTER CALCOAC BAUMANNII COMPLEX BY PCR: NOT DETECTED
ANION GAP SERPL CALC-SCNC: 9 MMOL/L (ref 2–11)
BACTEROIDES FRAGILIS BY PCR: NOT DETECTED
BASOPHILS # BLD: 0 K/UL (ref 0–0.2)
BASOPHILS NFR BLD: 1 % (ref 0–2)
BIOFIRE TEST COMMENT: ABNORMAL
BUN SERPL-MCNC: 6 MG/DL (ref 8–23)
C ALBICANS DNA BLD POS QL NAA+NON-PROBE: NOT DETECTED
C GLABRATA DNA BLD POS QL NAA+NON-PROBE: NOT DETECTED
C KRUSEI DNA BLD POS QL NAA+NON-PROBE: NOT DETECTED
C PARAP DNA BLD POS QL NAA+NON-PROBE: NOT DETECTED
C TROPICLS DNA BLD POS QL NAA+NON-PROBE: NOT DETECTED
CALCIUM SERPL-MCNC: 8.2 MG/DL (ref 8.3–10.4)
CANDIDA AURIS BY PCR: NOT DETECTED
CHLORIDE SERPL-SCNC: 106 MMOL/L (ref 101–110)
CO2 SERPL-SCNC: 24 MMOL/L (ref 21–32)
CREAT SERPL-MCNC: 0.4 MG/DL (ref 0.6–1)
CRYPTOCOCCUS NEOFORMANS/GATTII BY PCR: NOT DETECTED
DIFFERENTIAL METHOD BLD: ABNORMAL
E CLOAC COMP DNA BLD POS NAA+NON-PROBE: NOT DETECTED
E COLI DNA BLD POS QL NAA+NON-PROBE: NOT DETECTED
ENTEROBACTERALES BY PCR: NOT DETECTED
ENTEROCOCCUS FAECALIS BY PCR: NOT DETECTED
ENTEROCOCCUS FAECIUM BY PCR: NOT DETECTED
EOSINOPHIL # BLD: 0.4 K/UL (ref 0–0.8)
EOSINOPHIL NFR BLD: 10 % (ref 0.5–7.8)
ERYTHROCYTE [DISTWIDTH] IN BLOOD BY AUTOMATED COUNT: 13.9 % (ref 11.9–14.6)
GLUCOSE SERPL-MCNC: 87 MG/DL (ref 65–100)
GP B STREP DNA BLD POS QL NAA+NON-PROBE: NOT DETECTED
HAEM INFLU DNA BLD POS QL NAA+NON-PROBE: NOT DETECTED
HCT VFR BLD AUTO: 36 % (ref 35.8–46.3)
HGB BLD-MCNC: 11.8 G/DL (ref 11.7–15.4)
IMM GRANULOCYTES # BLD AUTO: 0 K/UL (ref 0–0.5)
IMM GRANULOCYTES NFR BLD AUTO: 1 % (ref 0–5)
K OXYTOCA DNA BLD POS QL NAA+NON-PROBE: NOT DETECTED
KLEBSIELLA AEROGENES BY PCR: NOT DETECTED
KLEBSIELLA PNEUMONIAE GROUP BY PCR: NOT DETECTED
L MONOCYTOG DNA BLD POS QL NAA+NON-PROBE: NOT DETECTED
LYMPHOCYTES # BLD: 0.6 K/UL (ref 0.5–4.6)
LYMPHOCYTES NFR BLD: 15 % (ref 13–44)
MAGNESIUM SERPL-MCNC: 2 MG/DL (ref 1.8–2.4)
MCH RBC QN AUTO: 29.4 PG (ref 26.1–32.9)
MCHC RBC AUTO-ENTMCNC: 32.8 G/DL (ref 31.4–35)
MCV RBC AUTO: 89.6 FL (ref 82–102)
MECA+MECC ISLT/SPM QL: NOT DETECTED
MONOCYTES # BLD: 0.4 K/UL (ref 0.1–1.3)
MONOCYTES NFR BLD: 11 % (ref 4–12)
N MEN DNA BLD POS QL NAA+NON-PROBE: NOT DETECTED
NEUTS SEG # BLD: 2.6 K/UL (ref 1.7–8.2)
NEUTS SEG NFR BLD: 62 % (ref 43–78)
NRBC # BLD: 0 K/UL (ref 0–0.2)
P AERUGINOSA DNA BLD POS NAA+NON-PROBE: NOT DETECTED
PLATELET # BLD AUTO: 309 K/UL (ref 150–450)
PMV BLD AUTO: 9.3 FL (ref 9.4–12.3)
POTASSIUM SERPL-SCNC: 3.3 MMOL/L (ref 3.5–5.1)
PROTEUS SP DNA BLD POS QL NAA+NON-PROBE: NOT DETECTED
RBC # BLD AUTO: 4.02 M/UL (ref 4.05–5.2)
RESISTANT GENE TARGETS: ABNORMAL
S AUREUS DNA BLD POS QL NAA+NON-PROBE: NOT DETECTED
S AUREUS+CONS DNA BLD POS NAA+NON-PROBE: DETECTED
S MARCESCENS DNA BLD POS NAA+NON-PROBE: NOT DETECTED
S PNEUM DNA BLD POS QL NAA+NON-PROBE: NOT DETECTED
S PYO DNA BLD POS QL NAA+NON-PROBE: NOT DETECTED
SALMONELLA SPECIES BY PCR: NOT DETECTED
SODIUM SERPL-SCNC: 139 MMOL/L (ref 133–143)
STAPHYLOCOCCUS EPIDERMIDIS BY PCR: DETECTED
STAPHYLOCOCCUS LUGDUNENSIS BY PCR: NOT DETECTED
STENOTROPHOMONAS MALTOPHILIA BY PCR: NOT DETECTED
STREPTOCOCCUS DNA BLD POS NAA+NON-PROBE: DETECTED
WBC # BLD AUTO: 4.2 K/UL (ref 4.3–11.1)

## 2022-11-17 PROCEDURE — 83735 ASSAY OF MAGNESIUM: CPT

## 2022-11-17 PROCEDURE — 80048 BASIC METABOLIC PNL TOTAL CA: CPT

## 2022-11-17 PROCEDURE — 2580000003 HC RX 258: Performed by: INTERNAL MEDICINE

## 2022-11-17 PROCEDURE — 92611 MOTION FLUOROSCOPY/SWALLOW: CPT

## 2022-11-17 PROCEDURE — 85025 COMPLETE CBC W/AUTO DIFF WBC: CPT

## 2022-11-17 PROCEDURE — 74230 X-RAY XM SWLNG FUNCJ C+: CPT

## 2022-11-17 PROCEDURE — 6360000002 HC RX W HCPCS: Performed by: INTERNAL MEDICINE

## 2022-11-17 PROCEDURE — 6360000002 HC RX W HCPCS: Performed by: FAMILY MEDICINE

## 2022-11-17 PROCEDURE — 36415 COLL VENOUS BLD VENIPUNCTURE: CPT

## 2022-11-17 PROCEDURE — 2500000003 HC RX 250 WO HCPCS: Performed by: INTERNAL MEDICINE

## 2022-11-17 PROCEDURE — 87040 BLOOD CULTURE FOR BACTERIA: CPT

## 2022-11-17 PROCEDURE — 92526 ORAL FUNCTION THERAPY: CPT

## 2022-11-17 PROCEDURE — 6370000000 HC RX 637 (ALT 250 FOR IP): Performed by: FAMILY MEDICINE

## 2022-11-17 PROCEDURE — C9113 INJ PANTOPRAZOLE SODIUM, VIA: HCPCS | Performed by: FAMILY MEDICINE

## 2022-11-17 PROCEDURE — A4216 STERILE WATER/SALINE, 10 ML: HCPCS | Performed by: FAMILY MEDICINE

## 2022-11-17 PROCEDURE — 1100000000 HC RM PRIVATE

## 2022-11-17 PROCEDURE — 2580000003 HC RX 258: Performed by: FAMILY MEDICINE

## 2022-11-17 RX ADMIN — SODIUM CHLORIDE 40 MG: 9 INJECTION, SOLUTION INTRAMUSCULAR; INTRAVENOUS; SUBCUTANEOUS at 09:41

## 2022-11-17 RX ADMIN — LEVETIRACETAM 20 MG: 100 SOLUTION ORAL at 20:48

## 2022-11-17 RX ADMIN — BARIUM SULFATE 15 ML: 0.81 POWDER, FOR SUSPENSION ORAL at 10:47

## 2022-11-17 RX ADMIN — CEFTRIAXONE 1000 MG: 1 INJECTION, POWDER, FOR SOLUTION INTRAMUSCULAR; INTRAVENOUS at 04:47

## 2022-11-17 RX ADMIN — VANCOMYCIN HYDROCHLORIDE 1250 MG: 10 INJECTION, POWDER, LYOPHILIZED, FOR SOLUTION INTRAVENOUS at 12:11

## 2022-11-17 RX ADMIN — BARIUM SULFATE 15 ML: 400 PASTE ORAL at 10:47

## 2022-11-17 RX ADMIN — SODIUM CHLORIDE, PRESERVATIVE FREE 10 ML: 5 INJECTION INTRAVENOUS at 21:04

## 2022-11-17 RX ADMIN — BISACODYL 10 MG: 10 SUPPOSITORY RECTAL at 09:41

## 2022-11-17 ASSESSMENT — PAIN SCALES - GENERAL
PAINLEVEL_OUTOF10: 0
PAINLEVEL_OUTOF10: 0

## 2022-11-17 NOTE — CARE COORDINATION
Spoke to Ms. Dexter Hathaway and her  Mr. Jamal Mendes in room 607 about discharge planning. She is admitted for dehydration, progressive aphasia, acute metabolic encephalopathy, dysphagia, and N/V. She has had frontal-temporal lobe dementia for a number of years. She is scheduled for an MBS this morning. Ms. Dexter Hathaway is bed and chair-bound with her  as the primary caregiver. Also helping is their son (lives with them), their daughter Ms. Yesika Herbert, and their daughter Jean Bailon ([employer] [facility]). She needs assistance with all ADLs, and can be walked to the toilet with assistance when toileting. They also have a manual WC. At discharge, plan is to return home (not to [facility] or SNF), and resume [facility]. She currently has [facility] PT and SLP. At discharge, will plan to add RN (), OT, and request an aide. Updated Ms. Flavio Katrina, SLP at [facility] ([phone]). 11/17/22 3344   Service Assessment   Patient Orientation Unable to Assess   Cognition Dementia / Early Alzheimer's  (aphasic)   History Provided By Spouse   Primary Caregiver Spouse   Accompanied By/Relationship    Support Systems Spouse/Significant Other;Children;[employer]   PCP Verified by CM Yes   Prior Functional Level Assistance with the following:;Bathing;Dressing; Toileting;Feeding;Cooking;Housework; Shopping;Mobility   Current Functional Level Assistance with the following:  (see above)   Can patient return to prior living arrangement Yes   Ability to make needs known: Poor   Condition of Participation: Discharge Planning   The Plan for Transition of Care is related to the following treatment goals: home when stable and resume [facility] (already has SLP and PT, will add OT, RN, and aide)

## 2022-11-17 NOTE — PROGRESS NOTES
VANCO DAILY FOLLOW UP NOTE  [facility] Pharmacokinetic Monitoring Service - Vancomycin    Consulting Provider: Dr. Kelsea Craig   Indication: Aspiration PNA/UTI  Target Concentration: Goal AUC/ZANE 400-600 mg*hr/L  Day of Therapy: 1  Additional Antimicrobials: Ceftriaxone    Patient eligible for piperacillin-tazobactam to cefepime auto-substitution per P&T approved protocol? N/A    Pertinent Laboratory Values: Wt Readings from Last 1 Encounters:   11/17/22 127 lb (57.6 kg)     Temp Readings from Last 1 Encounters:   11/17/22 98.5 °F (36.9 °C) (Oral)     Recent Labs     11/16/22  0345 11/16/22  0347 11/17/22  0515   BUN 6*  --  6*   CREATININE 0.50*  --  0.40*   WBC 6.2  --  4.2*   LACACIDPL  --  1.0  --      Estimated Creatinine Clearance: 116 mL/min (A) (based on SCr of 0.4 mg/dL (L)). No results found for: Junior Posey    MRSA Nasal Swab: N/A. Non-respiratory infection. Assessment:  Date/Time Dose Concentration AUC         Note: Serum concentrations collected for AUC dosing may appear elevated if collected in close proximity to the dose administered, this is not necessarily an indication of toxicity    Plan:  Dosing recommendations based on Bayesian software  Start vancomycin 1250 mg X 1, then 1000 mg Q12H  Anticipated AUC of 455 and trough concentration of 12.7 at steady state  Renal labs as indicated   Vancomycin concentrations will be ordered as clinically appropriate   Pharmacy will continue to monitor patient and adjust therapy as indicated    Thank you for the consult,  Krzysztof Burgess.  [facility]

## 2022-11-17 NOTE — PROGRESS NOTES
Patient rested well during this shift.  remains at side. Patient did have a large BM this morning.  Remains NPO for speech evaluation this AM.

## 2022-11-17 NOTE — PROGRESS NOTES
Hospitalist Progress Note   Admit Date:  2022  3:18 AM   Name:  Arturo Powell   Age:  67 y.o. Sex:  female  :  1950   MRN:  [id number]   Room:  [facility]/    Presenting Complaint: Emesis     Reason(s) for Admission: Mild dehydration [E86.0]  Primary progressive aphasia ([address] [G31.01, F02.80]  Acute cystitis without hematuria [C16.67]  Acute metabolic encephalopathy [L24.04]  Dysphagia, unspecified type [R13.10]  Nausea and vomiting, unspecified vomiting type [R11.2]     Hospital Course:      Arturo Powell is a 67 y.o. female with medical history of NEVAEH on CPAP who presented via EMS from home with nausea and vomiting at coffee-ground substance associated with constipation and no stool output of 1 week duration. EMS reported patient had a fever 102.4. Of note patient follows neurology Dr. Sruthi Dinh and per note 22, progressive aphasia has been worse since  associated with frontotemporal lobe dementia. At some point she stayed at a nursing home/assisted living but now she lives with her  and son with total assist, diffuse difficulties communicating and ambulating. Per neurology's note 2022, reinitiate Aricept 10 mg nightly, continue Namenda 10 mg twice daily, Klonopin 0.25 mg twice daily. Off of Lamictal due to sedation. Keppra at current dose so low that is not treating her. On mainly vitamins and oils at home. In ED, CBC and CMP unremarkable. UA c/o UTI. Pt was started on Rocephin    Subjective & 24hr Events (22): Patient minimally conversant. Follows commands. Family at bedside. Discussed our concern for aspiration risks. Family is requesting PC consult after MBS planned today. Assessment & Plan:     Acute GPC UTI/ ?GPC bacteremia      Abx: Rocephin (-. ..), added vanco    Ucx: GPC, ID/sens pending  Blood cx:   1 bottle of 2  - GPC ID/sens pending, [employer] pos staph and strep  Repeat blood cultures  pending  Consider TTE/ID consult pending ID/Sens    Progressive aphasia  Severe frontal temporal lobe dementia  History of seizure disorder  Patient follows neurology Dr. Surekha Peng and per note, aphasia has been worse since 2012 associated with frontotemporal lobe dementia. At baseline, she lives with her  and son with total assist, diffuse difficulties communicating and ambulating. Nonverbal mostly and not following much commands. Per neurology's note 9/2/2022, reinitiate Aricept 10 mg nightly, continue Namenda 10 mg twice daily, Klonopin 0.25 mg twice daily. Off of Lamictal due to sedation. Keppra at current dose so low that is not treating her. On mainly vitamins and oils at home  CT head on admission shows chronic findings  Continue supportive care  11/17  Cont home Klonopin, Keppra, Klonopin, Namenda--but holding for now d/t NPO  Limit sedating meds  Delirium precautions  PT/OT  S/P MBS with delayed penetration  Will request PC consult at family request  Suspect home hospice may be appropriate      Nausea and vomiting, resolved  Reported coffee ground. Hgb 13.2 on admission. Could be 2/2 gastritis. KUB w/o obstruction.  CT chest was negative for PE; no acute process  11/17  Antiemetics prn  Protonix IV   NPO for now secondary to dysphagia  Hold off on IVF as CXR shows mild vascular congestion  Monitor for improvement      Constipation  KUB obtained on admission w/o obstruction  Start Miralax daily and Dulcolax suppository daily  Monitor BM     Ascending Aorta Aneurysm  Incidentally captured on CT chest measuring up to 4.1cm on admission  CTM outpatient, needs vascular surgery referral for continued surveillance     Hypothyroidism  Cont home Synthroid      NEVAEH  Continue home CPAP nightly         Anticipated discharge needs:      pending    Diet:  Diet NPO  DVT PPx: scd  Code status: Full Code    Hospital Problems:  Principal Problem:    Acute UTI  Active Problems:    Progressive aphasia    Seizure disorder ([facility] [address] Frontotemporal dementia ([facility] [address]    Dehydration  Resolved Problems:    * No resolved hospital problems. *      Objective:   Patient Vitals for the past 24 hrs:   Temp Pulse Resp BP SpO2   11/17/22 1537 98.2 °F (36.8 °C) 91 16 131/82 92 %   11/17/22 1218 98.5 °F (36.9 °C) 81 16 110/67 97 %   11/17/22 0716 99.1 °F (37.3 °C) 81 16 90/61 92 %   11/17/22 0459 99 °F (37.2 °C) 82 18 111/84 92 %   11/16/22 2335 98.8 °F (37.1 °C) 82 18 102/65 93 %   11/16/22 2002 99.1 °F (37.3 °C) 80 18 118/84 93 %       Oxygen Therapy  SpO2: 92 %  O2 Device: None (Room air)    Estimated body mass index is 20.5 kg/m² as calculated from the following:    Height as of this encounter: 5' 6\" (1.676 m). Weight as of this encounter: 127 lb (57.6 kg). No intake or output data in the 24 hours ending 11/17/22 1633      Physical Exam:     Blood pressure 131/82, pulse 91, temperature 98.2 °F (36.8 °C), temperature source Oral, resp. rate 16, height 5' 6\" (1.676 m), weight 127 lb (57.6 kg), SpO2 92 %. General:    Well nourished. aphasic  Head:  Normocephalic, atraumatic  Eyes:  Sclerae appear normal.  Pupils equally round. ENT:  Nares appear normal, no drainage. Moist oral mucosa  Neck:  No restricted ROM. Trachea midline   CV:   RRR. No m/r/g. No jugular venous distension. Lungs:   CTAB. No wheezing, rhonchi, or rales. Symmetric expansion. Abdomen: Bowel sounds present. Soft, nontender, nondistended. Extremities: No cyanosis or clubbing. No edema  Skin:     No rashes and normal coloration. Warm and dry. Neuro:  CN II-XII grossly intact. Sensation intact. A&Ox3  Psych:  Normal mood and affect.       I have personally reviewed labs and tests showing:  Recent Labs:  Recent Results (from the past 48 hour(s))   CBC with Auto Differential    Collection Time: 11/16/22  3:45 AM   Result Value Ref Range    WBC 6.2 4.3 - 11.1 K/uL    RBC 4.39 4.05 - 5.2 M/uL    Hemoglobin 13.2 11.7 - 15.4 g/dL    Hematocrit 40.1 35.8 - 46.3 %    MCV 91.3 82 - 102 FL    MCH 30.1 26.1 - 32.9 PG    MCHC 32.9 31.4 - 35.0 g/dL    RDW 13.8 11.9 - 14.6 %    Platelets 512 922 - 179 K/uL    MPV 9.4 9.4 - 12.3 FL    nRBC 0.00 0.0 - 0.2 K/uL    Differential Type AUTOMATED      Seg Neutrophils 78 43 - 78 %    Lymphocytes 11 (L) 13 - 44 %    Monocytes 7 4.0 - 12.0 %    Eosinophils % 3 0.5 - 7.8 %    Basophils 1 0.0 - 2.0 %    Immature Granulocytes 0 0.0 - 5.0 %    Segs Absolute 4.9 1.7 - 8.2 K/UL    Absolute Lymph # 0.7 0.5 - 4.6 K/UL    Absolute Mono # 0.4 0.1 - 1.3 K/UL    Absolute Eos # 0.2 0.0 - 0.8 K/UL    Basophils Absolute 0.0 0.0 - 0.2 K/UL    Absolute Immature Granulocyte 0.0 0.0 - 0.5 K/UL   Comprehensive Metabolic Panel    Collection Time: 11/16/22  3:45 AM   Result Value Ref Range    Sodium 134 133 - 143 mmol/L    Potassium 4.1 3.5 - 5.1 mmol/L    Chloride 101 101 - 110 mmol/L    CO2 26 21 - 32 mmol/L    Anion Gap 7 2 - 11 mmol/L    Glucose 118 (H) 65 - 100 mg/dL    BUN 6 (L) 8 - 23 MG/DL    Creatinine 0.50 (L) 0.6 - 1.0 MG/DL    Est, Glom Filt Rate >60 >60 ml/min/1.73m2    Calcium 9.4 8.3 - 10.4 MG/DL    Total Bilirubin 0.5 0.2 - 1.1 MG/DL    ALT 24 12 - 65 U/L    AST 39 (H) 15 - 37 U/L    Alk Phosphatase 69 50 - 136 U/L    Total Protein 6.7 6.3 - 8.2 g/dL    Albumin 3.0 (L) 3.2 - 4.6 g/dL    Globulin 3.7 2.8 - 4.5 g/dL    Albumin/Globulin Ratio 0.8 0.4 - 1.6     CK    Collection Time: 11/16/22  3:45 AM   Result Value Ref Range    Total  21 - 215 U/L   Lipase    Collection Time: 11/16/22  3:45 AM   Result Value Ref Range    Lipase 105 73 - 393 U/L   Brain Natriuretic Peptide    Collection Time: 11/16/22  3:45 AM   Result Value Ref Range    NT Pro- (H) 5 - 125 PG/ML   Troponin    Collection Time: 11/16/22  3:45 AM   Result Value Ref Range    Troponin, High Sensitivity 64.0 (H) 0 - 14 pg/mL   Culture, Blood 1    Collection Time: 11/16/22  3:47 AM    Specimen: Blood   Result Value Ref Range    Special Requests RIGHT  Antecubital        Culture NO GROWTH 1 DAY     Culture, Blood 1    Collection Time: 11/16/22  3:47 AM    Specimen: Blood   Result Value Ref Range    Special Requests LEFT  FOREARM        Gram stain GRAM POSITIVE COCCI      Gram stain ANAEROBIC BOTTLE POSITIVE      Gram stain        RESULTS VERIFIED, PHONED TO AND READ BACK BY ALEX ALVARADO @ 8956 ON 11.17. 22YL    Culture CULTURE IN PROGRESS,FURTHER UPDATES TO FOLLOW      Culture        Refer to Blood Culture ID Panel Accession [id number]   Lactic Acid    Collection Time: 11/16/22  3:47 AM   Result Value Ref Range    Lactic Acid, Plasma 1.0 0.4 - 2.0 MMOL/L   Culture, Blood, PCR ID Panel    Collection Time: 11/16/22  3:47 AM    Specimen: Blood   Result Value Ref Range    Accession Number [id number]     Enterococcus faecalis by PCR NOT DETECTED NOTDET      Enterococcus faecium by PCR NOT DETECTED NOTDET      Listeria monocytogenes by PCR NOT DETECTED NOTDET      STAPHYLOCOCCUS Detected (A) NOTDET      Staphylococcus Aureus NOT DETECTED NOTDET      Staphylococcus epidermidis by PCR Detected (A) NOTDET      Staphylococcus lugdunensis by PCR NOT DETECTED NOTDET      STREPTOCOCCUS Detected (A) NOTDET      Streptococcus agalactiae (Group B) NOT DETECTED NOTDET      Strep pneumoniae NOT DETECTED NOTDET      Strep pyogenes,(Grp. A) NOT DETECTED NOTDET      Acinetobacter calcoac baumannii complex by PCR NOT DETECTED NOTDET      Bacteroides fragilis by PCR NOT DETECTED NOTDET      Enterobacteriaceae by PCR NOT DETECTED NOTDET      Enterobacter cloacae complex by PCR NOT DETECTED NOTDET      Escherichia Coli NOT DETECTED NOTDET      Klebsiella aerogenes by PCR NOT DETECTED NOTDET      Klebsiella oxytoca by PCR NOT DETECTED NOTDET      Klebsiella pneumoniae group by PCR NOT DETECTED NOTDET      Proteus by PCR NOT DETECTED NOTDET      Salmonella species by PCR NOT DETECTED NOTDET      Serratia marcescens by PCR NOT DETECTED NOTDET      Haemophilus Influenzae by PCR NOT DETECTED NOTDET      Neisseria meningitidis by PCR NOT DETECTED NOTDET      Pseudomonas aeruginosa NOT DETECTED NOTDET      Stenotrophomonas maltophilia by PCR NOT DETECTED NOTDET      Candida albicans by PCR NOT DETECTED NOTDET      Candida auris by PCR NOT DETECTED NOTDET      Candida glabrata NOT DETECTED NOTDET      Candida krusei by PCR NOT DETECTED NOTDET      Candida parapsilosis by PCR NOT DETECTED NOTDET      Candida tropicalis by PCR NOT DETECTED NOTDET      Cryptococcus neoformans/gattii by PCR NOT DETECTED NOTDET      Resistant gene targets          Methicillin Resistance mecA/C  by PCR NOT DETECTED NOTDET      [employer] test comment        False positive results may rarely occur.  Correlate with clinical,epidemiologic, and other laboratory findings   Urinalysis    Collection Time: 11/16/22  3:57 AM   Result Value Ref Range    Color, UA YELLOW      Appearance CLOUDY      Specific Gravity, UA 1.020 1.001 - 1.023      pH, Urine 6.5 5.0 - 9.0      Protein, UA Negative NEG mg/dL    Glucose, UA Negative NEG mg/dL    Ketones, Urine 15 (A) NEG mg/dL    Bilirubin Urine Negative NEG      Blood, Urine Negative NEG      Urobilinogen, Urine 1.0 0.2 - 1.0 EU/dL    Nitrite, Urine Positive (A) NEG      Leukocyte Esterase, Urine TRACE (A) NEG      WBC, UA 20-50 0 /hpf    RBC, UA 3-5 0 /hpf    Epithelial Cells UA 5-10 0 /hpf    BACTERIA, URINE 4+ (H) 0 /hpf    Casts 3-5 0 /lpf    Mucus, UA 2+ (H) 0 /lpf    Other observations RESULTS VERIFIED MANUALLY     Culture, Urine    Collection Time: 11/16/22  3:57 AM    Specimen: Urine, straight catheter   Result Value Ref Range    Special Requests NO SPECIAL REQUESTS      Culture (A)       >100,000 COLONIES/mL GRAM POSITIVE COCCI SUBCULTURE IN PROGRESS    Culture <1,000 CFU/ML NORMAL SKIN JOEY ISOLATED     EKG 12 Lead    Collection Time: 11/16/22  5:04 AM   Result Value Ref Range    Ventricular Rate 84 BPM    Atrial Rate 85 BPM    QRS Duration 89 ms    Q-T Interval 343 ms    QTc Calculation (Bazett) 406 ms    R Axis 82 degrees    T Axis 56 degrees    Diagnosis Normal sinus rhythm    Troponin    Collection Time: 11/16/22  6:12 AM   Result Value Ref Range    Troponin, High Sensitivity 62.0 (H) 0 - 14 pg/mL   COVID-19, Rapid    Collection Time: 11/16/22  7:45 AM    Specimen: Nasopharyngeal   Result Value Ref Range    Source NASAL      SARS-CoV-2, Rapid Not detected NOTD     Rapid influenza A/B antigens    Collection Time: 11/16/22  7:45 AM    Specimen: Nasal Washing   Result Value Ref Range    Influenza A Ag Negative NEG      Influenza B Ag Negative NEG      Source Nasopharyngeal     Basic Metabolic Panel w/ Reflex to MG    Collection Time: 11/17/22  5:15 AM   Result Value Ref Range    Sodium 139 133 - 143 mmol/L    Potassium 3.3 (L) 3.5 - 5.1 mmol/L    Chloride 106 101 - 110 mmol/L    CO2 24 21 - 32 mmol/L    Anion Gap 9 2 - 11 mmol/L    Glucose 87 65 - 100 mg/dL    BUN 6 (L) 8 - 23 MG/DL    Creatinine 0.40 (L) 0.6 - 1.0 MG/DL    Est, Glom Filt Rate >60 >60 ml/min/1.73m2    Calcium 8.2 (L) 8.3 - 10.4 MG/DL   CBC with Auto Differential    Collection Time: 11/17/22  5:15 AM   Result Value Ref Range    WBC 4.2 (L) 4.3 - 11.1 K/uL    RBC 4.02 (L) 4.05 - 5.2 M/uL    Hemoglobin 11.8 11.7 - 15.4 g/dL    Hematocrit 36.0 35.8 - 46.3 %    MCV 89.6 82 - 102 FL    MCH 29.4 26.1 - 32.9 PG    MCHC 32.8 31.4 - 35.0 g/dL    RDW 13.9 11.9 - 14.6 %    Platelets 978 825 - 906 K/uL    MPV 9.3 (L) 9.4 - 12.3 FL    nRBC 0.00 0.0 - 0.2 K/uL    Differential Type AUTOMATED      Seg Neutrophils 62 43 - 78 %    Lymphocytes 15 13 - 44 %    Monocytes 11 4.0 - 12.0 %    Eosinophils % 10 (H) 0.5 - 7.8 %    Basophils 1 0.0 - 2.0 %    Immature Granulocytes 1 0.0 - 5.0 %    Segs Absolute 2.6 1.7 - 8.2 K/UL    Absolute Lymph # 0.6 0.5 - 4.6 K/UL    Absolute Mono # 0.4 0.1 - 1.3 K/UL    Absolute Eos # 0.4 0.0 - 0.8 K/UL    Basophils Absolute 0.0 0.0 - 0.2 K/UL    Absolute Immature Granulocyte 0.0 0.0 - 0.5 K/UL   Magnesium    Collection Time: 11/17/22  5:15 AM   Result Value Ref Range    Magnesium 2.0 1.8 - 2.4 mg/dL       I have personally reviewed imaging studies showing: Other Studies:  FL MODIFIED BARIUM SWALLOW W VIDEO   Final Result   1. No evidence of aspiration. Residuals retained within the piriformis with   resultant delayed penetration. Please see full report from speech language   pathologist.         [address] [facility] [address]   Final Result   Unremarkable renal ultrasound. No hydronephrosis. XR ABDOMEN (KUB) (SINGLE AP VIEW)   Final Result   No acute abnormality. No findings present to suspect bowel   obstruction. CT HEAD WO CONTRAST   Final Result   1. No CT evidence of acute intracranial process. 2. Persistent ventricular dilatation, not significantly changed. CT CHEST PULMONARY EMBOLISM W CONTRAST   Final Result   1. No evidence of pulmonary embolism. 2. No acute intrathoracic process. 3. Aneurysmal dilatation of the ascending aorta measuring up to 4.1 cm         XR CHEST PORTABLE   Final Result   There is mild pulmonary vascular prominence which may represent congestion. This   is best appreciated on the right. No consolidation or pleural effusion.              Current Meds:  Current Facility-Administered Medications   Medication Dose Route Frequency    [START ON 11/18/2022] vancomycin (VANCOCIN) 1,000 mg in sodium chloride 0.9 % 250 mL IVPB ([id number])  1,000 mg IntraVENous Q12H    levothyroxine (SYNTHROID) tablet 100 mcg  100 mcg Oral Daily    memantine (NAMENDA) tablet 10 mg  10 mg Oral BID    sodium chloride flush 0.9 % injection 5-40 mL  5-40 mL IntraVENous 2 times per day    sodium chloride flush 0.9 % injection 5-40 mL  5-40 mL IntraVENous PRN    0.9 % sodium chloride infusion   IntraVENous PRN    ondansetron (ZOFRAN-ODT) disintegrating tablet 4 mg  4 mg Oral Q8H PRN    Or    ondansetron (ZOFRAN) injection 4 mg  4 mg IntraVENous Q6H PRN    polyethylene glycol (GLYCOLAX) packet 17 g  17 g Oral Daily PRN    acetaminophen (TYLENOL) tablet 650 mg  650 mg Oral Q6H PRN    Or    acetaminophen (TYLENOL) suppository 650 mg  650 mg Rectal Q6H PRN    pantoprazole (PROTONIX) 40 mg in sodium chloride (PF) 0.9 % 10 mL injection  40 mg IntraVENous Daily    cefTRIAXone (ROCEPHIN) 1,000 mg in sodium chloride 0.9 % 50 mL IVPB mini-bag  1,000 mg IntraVENous Q24H    aspirin EC tablet 81 mg  81 mg Oral Daily    levETIRAcetam (KEPPRA) 100 MG/ML solution 20 mg  20 mg Oral BID    clonazePAM (KLONOPIN) tablet 0.25 mg  0.25 mg Oral Q12H    bisacodyl (DULCOLAX) suppository 10 mg  10 mg Rectal Daily    polyethylene glycol (GLYCOLAX) packet 17 g  17 g Oral Daily    fluticasone (FLONASE) 50 MCG/ACT nasal spray 1 spray  1 spray Each Nostril Daily    LORazepam (ATIVAN) injection 1 mg  1 mg IntraVENous Q4H PRN       Signed:  Lawanda Wilkerson DO    Part of this note may have been written by using a voice dictation software. The note has been proof read but may still contain some grammatical/other typographical errors.

## 2022-11-17 NOTE — PROGRESS NOTES
GOALS:   LTG: Patient will tolerate safest, least restrictive oral diet without s/sx airway compromise  STG: Patient will consume thin liquids via tsp and small bites of puree for pleasure with caregiver demonstrating appropriate implementation of taught careful hand feeding strategies. Added   STG: Patient will participate in modified barium swallow study to objectively assess oropharyngeal swallow if indicated. MET      SPEECH LANGUAGE PATHOLOGY: MODIFIED BARIUM SWALLOW STUDY Initial Assessment    NAME: Nuris Grant  : 1950  MRN: [id number]    ADMISSION DATE: 2022  PRIMARY DIAGNOSIS: Acute UTI  Mild dehydration [E86.0]  Primary progressive aphasia ([facility] [address] [G31.01, F02.80]  Acute cystitis without hematuria [R02.47]  Acute metabolic encephalopathy [U97.06]  Dysphagia, unspecified type [R13.10]  Nausea and vomiting, unspecified vomiting type [R11.2]  Date of Eval: 2022    ICD-10: Treatment Diagnosis: R13.12 Dysphagia, Oropharyngeal Phase     RECOMMENDATIONS   Diet:   PENDING GOALS/FAMILY WISHES  If family wishes to pursue oral diet with known aspiration risk-  Recommend pureed consistencies and thin liquids via tsp using careful hand feeding strategies      Medications:  defer to MD      Compensatory strategies/Careful hand feeding strategies  Small bites   Liquids via tsp  Slow rate of intake  Smaller more frequent intake  Upright during oral intake and remain upright 30 min after  No force feeding     Interventions/Recommendations/Referrals  Therapeutic Interventions: Patient/Family education;Oral care  Referral to: palliative care    Patient continues to require skilled intervention: Yes  Ongoing speech therapy is recommended during this hospitalization; To be determined     ASSESSMENT    Patient presents with severe oropharyngeal dysphagia.    Impaired oral acceptance, bolus holding with slowed/delayed oral clearance, impaired tongue base retraction and hyolaryngeal movement resulted in poor pharyngeal clearance with residue pooling in pyriforms and intermittent laryngeal penetration between swallow attempts. Patient with increased congestion, weak ineffective cough response, difficulty expectorating bolus orally and impaired ability to follow commands or elicit spontaneous double swallow to effectively clear pharynx. Recommend discussing goals of care as patient at risk of aspiration with any oral intake. Given progression of patient's dementia, family very understanding and hopeful to discuss further with palliative care. If family decides to accept risks of aspiration, recommend pureed foods and thin liquids via tsp and training in careful hand feeding to reduce amount of aspiration. GENERAL    General  Chart Reviewed: Yes  patient with history of frontotemporal dementia, primary progressive aphasia. Subjective: patient lethargic upon arrival. family roused patient to participate. Behavior/Cognition: Alert       Pain:   Patient does not appear in pain                                        Dysphagia History                  Diet Solids Recommendation: NPO  and Liquid Consistency Recommendation: NPO            History of Present Injury/Illness: Ms. Manuel Santiago  has a past medical history of Fibroid, uterine, Other ill-defined conditions(799.89), Psychiatric disorder, and Seizure disorder ([facility] [address] . She also  has a past surgical history that includes Adenoidectomy; Appendectomy; gyn; and Tonsillectomy. OBJECTIVE    Oral Motor Exam: limited due to impaired command following. Oral Hygiene: Clean;Moist    Labial: No impairment  Lingual: No impairment  Volitional Cough: Weak  Baseline Vocal Quality: Weak;Dysphonic; Wet    Swallowing Study Trial  Type of Study: Modified barium swallow  Film Views: Lateral;C-Arm  Fluoroscopy completed by : MARIELA Sullivan   PO trials  Patient was presented with trials of thin liquids (by spoon) and pudding; refused cup/straw sips.      Oral phase:  refusal of bolus attempt of thin via cup/straw; required MAX encouragement to accept small amount of pudding  reduced oral opening/impaired acceptance; oral defensiveness  anterior bolus holding, prolonged bolus manipulation, slow oral transit, reduced posterior propulsion skills  reduced bolus control, premature spillage to pharynx pre-swallow with liquids  reduced posterior tongue elevation    Pharyngeal phase:  Swallows of thin liquids via tsp were triggered at valleculae. No penetration or aspiration during initial swallow. There was significant pharyngeal residue pooling in pyriforms, ~1/2 bolus. Swallows of pudding were incomplete resulting in max valleculae residue, which then fell to pyriforms. laryngeal penetration of pharyngeal residue occurred between swallow attempts intermittently. Bolus pools in pyriforms ~15 seconds then patient began coughing which resulted in residue spreading out within pharynx and suspected spontaneous swallowed as residue improved to mild amount after prolonged amount of time. Audible congestion, weak coughing. Presented thin liquid via tsp as rinse. No penetration or aspiration during the swallow; however, impaired pharyngeal clearance with ~1/2 bolus pooling in pyriforms. Swallowing Physiology  Decreased Tongue Base Retraction?: Yes  Laryngeal Elevation: Inadequate epiglottic inversion; Incomplete laryngeal closure;Minimal movement of larynx/epiglottis; Reduced excursion with laryngeal vestibule gap  Pharyngeal Dysfunction: Decreased strength;Decreased elevation/closure;Decreased tongue base retraction  Penetration-Aspiration Scale (PAS): 3 - Material enters the airway, remains above the vocal folds, and is not ejected from airway    Upper Esophageal Screen:  reduced UES opening due to pharynx deficits. *Distal esophagus not assessed due to limitations of modified barium swallow study.     PLAN    Duration/Frequency: Continue to follow patient 1x/week for duration of hospitalization and/or until goals met pending goals. Dysphagia Outcome and Severity Scale (ANICETO)  Dysphagia Outcome Severity Scale: Level 1: Severe dysphagia- NPO. Unable to tolerate any PO safely  Interpretation of Tool: The Dysphagia Outcome and Severity Scale (ANICETO) is a simple, easy-to-use, 7-point scale developed to systematically rate the functional severity of dysphagia based on objective assessment and make recommendations for diet level, independence level, and type of nutrition. Normal(7), Functional(6), Mild(5), Mild-Moderate(4), Moderate(3), Moderate-Severe(2), Severe(1)    Speech Therapy Prognosis  Prognosis: Guarded  Education:  Patient Education: modified barium swallow study results, aspiration risk, concerns, and further discussion regarding goals. patient's  and daughter demonstrated understanding. Patient Education Response: Needs reinforcement  Individuals consulted  Consulted and agree with results and recommendations: Patient; Family member;Physician    Current Medications:   No current facility-administered medications on file prior to encounter. Current Outpatient Medications on File Prior to Encounter   Medication Sig Dispense Refill    aspirin 81 MG chewable tablet Take 81 mg by mouth daily      donepezil (ARICEPT) 10 MG tablet Take 10 mg by mouth nightly (Patient not taking: Reported on 11/16/2022)      clonazePAM (KLONOPIN) 0.5 MG tablet Take 0.25 mg by mouth 2 times daily as needed.       levETIRAcetam (KEPPRA) 100 MG/ML solution Take 20 mg by mouth 2 times daily Family has weaned pt      levothyroxine (SYNTHROID) 75 MCG tablet Take 100 mcg by mouth daily      memantine (NAMENDA) 10 MG tablet Take 10 mg by mouth 2 times daily         PRECAUTIONS/ALLERGIES: Sulfa antibiotics, Wasp venom protein, and Penicillins     SAFETY: Safety Devices in place: Yes  Type of devices:  (with transport heading back to room.)      Therapy Time  SLP Individual Minutes  Time In: 1030  Time Out: 1100  Minutes: Daylin Turpin  11/17/2022 12:04 PM

## 2022-11-17 NOTE — PROGRESS NOTES
Comprehensive Nutrition Assessment    Type and Reason for Visit: Initial, Positive Nutrition Screen  Malnutrition Screening Tool: Malnutrition Screen  Have you recently lost weight without trying?: 14 to 23 pounds (2 points)  Have you been eating poorly because of a decreased appetite?: No (0 points)  Malnutrition Screening Tool Score: 2    Nutrition Recommendations/Plan:   Continue NPO. Oral diet progression per goals of care. If oral intake pursued pt would likely benefit from feedings of nutrient dense items within guidelines as it is anticipated her quantity of intake will continue to decline. Malnutrition Assessment:  Malnutrition Status: At risk for malnutrition (Comment) (acute declining oral intake, weight loss, mild muscle loss temporal and hands)   Based on recorded measured bed scale wt pt has lost 8% of weight over 11 months. If admission wt of 119# was obtained with a scale pt has experienced ~11% wt loss since July which would correspond with recent trend of declining intake and decreased activity. Nutrition Assessment:  Nutrition History: History per  and daughter at bedside. Son is primary care giver during the day at home. All family is very involved in her care. Gradual minimal weight loss noted over the past year. Pt has experienced increasing cough and difficulties with po for 5-6 weeks. During this time her activities have declined as well, she previously walked up to 1 mile per day. She has issues with constipation off and on at baseline. Pt most recently is dependent feed at times requires syringe feeding. Do You Have Any Cultural, Latter-day, or Ethnic Food Preferences?: No   Nutrition Background:       PMH remarkable for NEVAEH on CPAP who presented via EMS from home with nausea, vomiting coffee ground substance associated with constipation and no stool output for 1 week duration. Progressive aphasia worse since 2012 associated with frontotemporal lobe dementia. Admitted with acute UTI, progressive aphasia, severe frontal temporal lobe dementia, hx of seizure d/o, NV resolved, constipation, ascending aorta aneurysm, hypothyroidism, NEVAEH. Nutrition Interval:  SLP following: MBS 11/17. Pt is alert, makes eye contact smiles, minimal verbal attempts. Hx per family at bedside. Family indicates a desire to continue to provide the best care possible to pt and indicate absolute no to FT, they have questions re optimizing safest nutrition, fluid needs and controlling secretions. Palliative care consult pending. Current Nutrition Therapies:  Diet NPO    Current Intake:   Average Meal Intake: NPO        Anthropometric Measures:  Height: 5' 6\" (167.6 cm)  Current Body Wt: 126 lb 15.8 oz (57.6 kg) (11/17), Weight source: Bed Scale  BMI: 20.5, Underweight (BMI less than 22) age over 72  Admission Body Weight: 119 lb (54 kg) (no wt source family reports as bed scale)  Ideal Body Weight (Kg) (Calculated): 59 kg (130 lbs), 97.7 %  Usual Body Wt: 138 lb (62.6 kg) (12/16/21 FM office), Percent weight change: -8       Edema:Peripheral Vascular  Peripheral Vascular (WDL): Within Defined Limits  Edema: None   BMI Category Underweight (BMI less than 22) age over 72  Estimated Daily Nutrient Needs:  Energy (kcal/day): 3504-9488 ((25-30 kcal/kg)) (Kcal/kg Weight used: 54 kg Admission  Protein (g/day): 65-81 (1.2-1.5 g/kg) Weight Used: (Admission) 54 kg  Fluid (ml/day):   (1 ml/kcal)    Nutrition Diagnosis:   Inadequate oral intake related to swallowing difficulty as evidenced by NPO or clear liquid status due to medical condition, swallow study results  Nutrition Interventions:   Food and/or Nutrient Delivery: Continue NPO     Coordination of Nutrition Care: Continue to monitor while inpatient, Interdisciplinary Rounds  Plan of Care discussed with: Joyce Grullon RN and Dr. Wilmer Pa    Goals:       Active Goal: by next RD assessment (Tolerate oral nutrition within goals of care)       Nutrition Monitoring and Evaluation:      Food/Nutrient Intake Outcomes: Diet Advancement/Tolerance  Physical Signs/Symptoms Outcomes: Biochemical Data, GI Status, Weight    Discharge Planning:     Too soon to determine    VIOLETA RAJAN, RD

## 2022-11-17 NOTE — PROGRESS NOTES
LTG: Patient will tolerate safest, least restrictive oral diet without s/sx airway compromise  STG: Patient will tolerate ongoing po trials in efforts to advance diet as appropriate   STG: Patient will participate in modified barium swallow study to objectively assess oropharyngeal swallow if indicated      SPEECH LANGUAGE PATHOLOGY: DYSPHAGIA  Daily Note #1    NAME: Kathi Babin  : 1950  MRN: [id number]    ADMISSION DATE: 2022  PRIMARY DIAGNOSIS: Acute UTI  Mild dehydration [E86.0]  Primary progressive aphasia ([facility] [address] [G31.01, F02.80]  Acute cystitis without hematuria [G80.77]  Acute metabolic encephalopathy [U90.82]  Dysphagia, unspecified type [R13.10]  Nausea and vomiting, unspecified vomiting type [R11.2]    ICD-10: Treatment Diagnosis: R13.12 Dysphagia, Oropharyngeal Phase    RECOMMENDATIONS   Diet:  Diet Solids Recommendation: NPO  Liquid Consistency Recommendation: NPO    Medications: Other (defer to MD)     Compensatory Swallowing Strategies:  n/a  Recommendations: modified barium swallow study     Patient continues to require skilled intervention: Yes  D/C Recommendations: Ongoing speech therapy is recommended during this hospitalization, Ongoing speech therapy is recommended at next level of care     ASSESSMENT    Dysphagia Diagnosis: Moderate to severe oral stage dysphagia; Moderate to severe pharyngeal stage dysphagia  Patient demonstrates baseline congestion. Slowed oral clearance, 2-3 swallows upon palpation, increased congestion and delayed weak cough. Concerns for reduced clearance and airway compromise. Recommend NPO. Plan for modified barium swallow study to objectively assess oropharyngeal swallow. GENERAL    History of Present Injury/Illness: Ms. Allyson Burns  has a past medical history of Fibroid, uterine, Other ill-defined conditions(799.89), Psychiatric disorder, and Seizure disorder ([facility] [address] . She also  has a past surgical history that includes Adenoidectomy;  Appendectomy; gyn; and Tonsillectomy. Chart Reviewed: Yes  Subjective:  at bedside  Behavior/Cognition: Alert  Communication Observation:  (minimally verbal. primarily \"no\")  Follows Directions: None (rarely imitates model)    Prior Dysphagia History: reportedly patient eats sofer solids and family cuts into bite sized pieces. concerns for dysphagia and aspiration for a few weeks per family. coughing and congestion with po intake     Current Diet : NPO  Current Liquid Diet : NPO    Respiratory Status: Room air              Pain:   Patient does not appear in pain                                        OBJECTIVE    Patient Positioning: Upright in bed   Oral Motor   Labial: No impairment  Oral Hygiene: Clean;Moist  Lingual: No impairment  Dentition: Adequate    Baseline Vocal Quality: Weak;Dysphonic; Wet    Oropharyngeal Phase:  Patient with audible congestion. Weak/congested baseline cough. Patient presented with ice chips, small amount of thin liquid via tsp, and puree via tsp. Patient demonstrates reduced acceptance requiring encouragement. Slowed oral transit and delayed swallow. 2-3 swallows upon palpation, increased congestion and weak delayed coughing. Difficulty clearing audible congestion. Further trials deferred-recommend objective assessment via MBSS. PLAN    Duration/Frequency: Continue to follow patient 3x/week for duration of hospitalization and/or until goals met    Dysphagia Outcome and Severity Scale (ANICETO)  Dysphagia Outcome Severity Scale: Level 2: Moderate Severe dysphagia- Maximum assistance or maximum use of strategies with partial PO only  Interpretation of Tool: The Dysphagia Outcome and Severity Scale (ANICETO) is a simple, easy-to-use, 7-point scale developed to systematically rate the functional severity of dysphagia based on objective assessment and make recommendations for diet level, independence level, and type of nutrition.    Normal(7), Functional(6), Mild(5), Mild-Moderate(4), Moderate(3), Moderate-Severe(2), Severe(1)         Education: Patient, Family member, RN, Physician  Patient Education: plan for MBSS discussed with patient's  at bedside and daughter via phone. both agreeable and demonstrate understanding of plan, aspiration concerns/precautions.   Patient Education Response: Verbalizes understanding, Needs reinforcement    PRECAUTIONS/ALLERGIES: Sulfa antibiotics, Wasp venom protein, and Penicillins   Safety Devices in place: Yes  Type of devices: Call light within reach, Left in bed ( at bedside)    Therapy Time  SLP Individual Minutes  Time In: 0803  Time Out: 0827  Minutes: Damaris Goodrich [address] [place] [address]  11/17/2022 8:41 AM

## 2022-11-18 PROBLEM — J69.0 ASPIRATION PNEUMONIA (HCC): Status: ACTIVE | Noted: 2022-11-18

## 2022-11-18 LAB
ALBUMIN SERPL-MCNC: 2.5 G/DL (ref 3.2–4.6)
ALBUMIN/GLOB SERPL: 0.8 {RATIO} (ref 0.4–1.6)
ALP SERPL-CCNC: 58 U/L (ref 50–136)
ALT SERPL-CCNC: 19 U/L (ref 12–65)
ANION GAP SERPL CALC-SCNC: 6 MMOL/L (ref 2–11)
ANION GAP SERPL CALC-SCNC: 7 MMOL/L (ref 2–11)
AST SERPL-CCNC: 25 U/L (ref 15–37)
BASOPHILS # BLD: 0 K/UL (ref 0–0.2)
BASOPHILS NFR BLD: 1 % (ref 0–2)
BILIRUB SERPL-MCNC: 0.3 MG/DL (ref 0.2–1.1)
BUN SERPL-MCNC: 6 MG/DL (ref 8–23)
BUN SERPL-MCNC: 8 MG/DL (ref 8–23)
CALCIUM SERPL-MCNC: 9 MG/DL (ref 8.3–10.4)
CALCIUM SERPL-MCNC: 9.2 MG/DL (ref 8.3–10.4)
CHLORIDE SERPL-SCNC: 108 MMOL/L (ref 101–110)
CHLORIDE SERPL-SCNC: 109 MMOL/L (ref 101–110)
CO2 SERPL-SCNC: 24 MMOL/L (ref 21–32)
CO2 SERPL-SCNC: 25 MMOL/L (ref 21–32)
CREAT SERPL-MCNC: 0.4 MG/DL (ref 0.6–1)
CREAT SERPL-MCNC: 0.4 MG/DL (ref 0.6–1)
DIFFERENTIAL METHOD BLD: ABNORMAL
EOSINOPHIL # BLD: 0.5 K/UL (ref 0–0.8)
EOSINOPHIL NFR BLD: 11 % (ref 0.5–7.8)
ERYTHROCYTE [DISTWIDTH] IN BLOOD BY AUTOMATED COUNT: 13.8 % (ref 11.9–14.6)
GLOBULIN SER CALC-MCNC: 3.3 G/DL (ref 2.8–4.5)
GLUCOSE SERPL-MCNC: 102 MG/DL (ref 65–100)
GLUCOSE SERPL-MCNC: 82 MG/DL (ref 65–100)
HCT VFR BLD AUTO: 36.8 % (ref 35.8–46.3)
HGB BLD-MCNC: 12.1 G/DL (ref 11.7–15.4)
IMM GRANULOCYTES # BLD AUTO: 0 K/UL (ref 0–0.5)
IMM GRANULOCYTES NFR BLD AUTO: 1 % (ref 0–5)
LYMPHOCYTES # BLD: 0.6 K/UL (ref 0.5–4.6)
LYMPHOCYTES NFR BLD: 16 % (ref 13–44)
MAGNESIUM SERPL-MCNC: 2.2 MG/DL (ref 1.8–2.4)
MCH RBC QN AUTO: 30 PG (ref 26.1–32.9)
MCHC RBC AUTO-ENTMCNC: 32.9 G/DL (ref 31.4–35)
MCV RBC AUTO: 91.1 FL (ref 82–102)
MONOCYTES # BLD: 0.5 K/UL (ref 0.1–1.3)
MONOCYTES NFR BLD: 11 % (ref 4–12)
NEUTS SEG # BLD: 2.5 K/UL (ref 1.7–8.2)
NEUTS SEG NFR BLD: 60 % (ref 43–78)
NRBC # BLD: 0 K/UL (ref 0–0.2)
PLATELET # BLD AUTO: 313 K/UL (ref 150–450)
PMV BLD AUTO: 9.3 FL (ref 9.4–12.3)
POTASSIUM SERPL-SCNC: 3.2 MMOL/L (ref 3.5–5.1)
POTASSIUM SERPL-SCNC: 3.6 MMOL/L (ref 3.5–5.1)
PROT SERPL-MCNC: 5.8 G/DL (ref 6.3–8.2)
RBC # BLD AUTO: 4.04 M/UL (ref 4.05–5.2)
SODIUM SERPL-SCNC: 139 MMOL/L (ref 133–143)
SODIUM SERPL-SCNC: 140 MMOL/L (ref 133–143)
WBC # BLD AUTO: 4.1 K/UL (ref 4.3–11.1)

## 2022-11-18 PROCEDURE — 6360000002 HC RX W HCPCS: Performed by: FAMILY MEDICINE

## 2022-11-18 PROCEDURE — 6360000002 HC RX W HCPCS: Performed by: INTERNAL MEDICINE

## 2022-11-18 PROCEDURE — 97530 THERAPEUTIC ACTIVITIES: CPT

## 2022-11-18 PROCEDURE — 1100000000 HC RM PRIVATE

## 2022-11-18 PROCEDURE — 92526 ORAL FUNCTION THERAPY: CPT

## 2022-11-18 PROCEDURE — 97112 NEUROMUSCULAR REEDUCATION: CPT

## 2022-11-18 PROCEDURE — 2400000000

## 2022-11-18 PROCEDURE — A4216 STERILE WATER/SALINE, 10 ML: HCPCS | Performed by: FAMILY MEDICINE

## 2022-11-18 PROCEDURE — 2580000003 HC RX 258: Performed by: INTERNAL MEDICINE

## 2022-11-18 PROCEDURE — 80053 COMPREHEN METABOLIC PANEL: CPT

## 2022-11-18 PROCEDURE — 6370000000 HC RX 637 (ALT 250 FOR IP): Performed by: INTERNAL MEDICINE

## 2022-11-18 PROCEDURE — 2580000003 HC RX 258: Performed by: FAMILY MEDICINE

## 2022-11-18 PROCEDURE — 2500000003 HC RX 250 WO HCPCS: Performed by: INTERNAL MEDICINE

## 2022-11-18 PROCEDURE — 85025 COMPLETE CBC W/AUTO DIFF WBC: CPT

## 2022-11-18 PROCEDURE — C9113 INJ PANTOPRAZOLE SODIUM, VIA: HCPCS | Performed by: FAMILY MEDICINE

## 2022-11-18 PROCEDURE — 36415 COLL VENOUS BLD VENIPUNCTURE: CPT

## 2022-11-18 PROCEDURE — 83735 ASSAY OF MAGNESIUM: CPT

## 2022-11-18 RX ORDER — METRONIDAZOLE 500 MG/100ML
500 INJECTION, SOLUTION INTRAVENOUS EVERY 12 HOURS
Status: DISCONTINUED | OUTPATIENT
Start: 2022-11-18 | End: 2022-11-21 | Stop reason: HOSPADM

## 2022-11-18 RX ORDER — DEXTROSE, SODIUM CHLORIDE, AND POTASSIUM CHLORIDE 5; .45; .3 G/100ML; G/100ML; G/100ML
INJECTION INTRAVENOUS CONTINUOUS
Status: DISCONTINUED | OUTPATIENT
Start: 2022-11-18 | End: 2022-11-21 | Stop reason: HOSPADM

## 2022-11-18 RX ORDER — SCOLOPAMINE TRANSDERMAL SYSTEM 1 MG/1
1 PATCH, EXTENDED RELEASE TRANSDERMAL
Status: DISCONTINUED | OUTPATIENT
Start: 2022-11-18 | End: 2022-11-21 | Stop reason: HOSPADM

## 2022-11-18 RX ADMIN — SODIUM CHLORIDE, PRESERVATIVE FREE 10 ML: 5 INJECTION INTRAVENOUS at 20:31

## 2022-11-18 RX ADMIN — POTASSIUM CHLORIDE, DEXTROSE MONOHYDRATE AND SODIUM CHLORIDE: 300; 5; 450 INJECTION, SOLUTION INTRAVENOUS at 10:20

## 2022-11-18 RX ADMIN — VANCOMYCIN HYDROCHLORIDE 1000 MG: 1 INJECTION, POWDER, LYOPHILIZED, FOR SOLUTION INTRAVENOUS at 11:43

## 2022-11-18 RX ADMIN — CEFTRIAXONE 1000 MG: 1 INJECTION, POWDER, FOR SOLUTION INTRAMUSCULAR; INTRAVENOUS at 05:51

## 2022-11-18 RX ADMIN — POTASSIUM CHLORIDE, DEXTROSE MONOHYDRATE AND SODIUM CHLORIDE: 300; 5; 450 INJECTION, SOLUTION INTRAVENOUS at 20:36

## 2022-11-18 RX ADMIN — METRONIDAZOLE 500 MG: 500 INJECTION, SOLUTION INTRAVENOUS at 20:37

## 2022-11-18 RX ADMIN — VANCOMYCIN HYDROCHLORIDE 1000 MG: 1 INJECTION, POWDER, LYOPHILIZED, FOR SOLUTION INTRAVENOUS at 00:59

## 2022-11-18 RX ADMIN — SODIUM CHLORIDE 40 MG: 9 INJECTION, SOLUTION INTRAMUSCULAR; INTRAVENOUS; SUBCUTANEOUS at 09:05

## 2022-11-18 RX ADMIN — SODIUM CHLORIDE, PRESERVATIVE FREE 10 ML: 5 INJECTION INTRAVENOUS at 09:05

## 2022-11-18 ASSESSMENT — PAIN SCALES - GENERAL: PAINLEVEL_OUTOF10: 0

## 2022-11-18 NOTE — PROGRESS NOTES
GOALS:   LTG: Patient will tolerate safest, least restrictive oral diet without s/sx airway compromise  STG: Patient will consume thin liquids via tsp and small bites of puree for pleasure with caregiver demonstrating appropriate implementation of taught careful hand feeding strategies. Added   STG: Patient will participate in modified barium swallow study to objectively assess oropharyngeal swallow if indicated. MET     SPEECH LANGUAGE PATHOLOGY: DYSPHAGIA  Daily Note #1    NAME: Antony Roa  : 1950  MRN: [id number]    ADMISSION DATE: 2022  PRIMARY DIAGNOSIS: Acute UTI  Mild dehydration [E86.0]  Primary progressive aphasia ([facility] [address] [G31.01, F02.80]  Acute cystitis without hematuria [P48.56]  Acute metabolic encephalopathy [R55.71]  Dysphagia, unspecified type [R13.10]  Nausea and vomiting, unspecified vomiting type [R11.2]    ICD-10: Treatment Diagnosis: R13.12 Dysphagia, Oropharyngeal Phase    RECOMMENDATIONS   Diet:  PENDING GOALS/FAMILY WISHES  If family wishes to pursue oral diet for pleasure-  Recommend pureed consistencies and thin liquids via tsp using careful hand feeding strategies     Medications: Other (defer to MD)      Compensatory strategies/Careful hand feeding strategies  Small bites   Liquids via tsp  Slow rate of intake  Smaller more frequent intake  Upright during oral intake and remain upright 30 min after  No force feeding  Other recommendations: Oral care, elevate HOB   Patient continues to require skilled intervention: Yes  D/C Recommendations: Ongoing speech therapy is recommended during this hospitalization     ASSESSMENT    Dysphagia Diagnosis: Moderate to severe oral stage dysphagia; Severe pharyngeal stage dysphagia  Followed up for dysphagia education and family/caregiver training on careful hand feeding strategies in the setting of advanced dementia. Patient accepted minimal amounts of ice and thin liquids via tsp.  Delayed swallow and intermittent delayed congested weak cough. Although patient not appropriate for dysphagia exercises, speech therapy remains available and will follow loosely as appropriate during admission for caregiver training/education based on goals/plan. GENERAL    History of Present Injury/Illness: Ms. Windell Dakins  has a past medical history of Fibroid, uterine, Other ill-defined conditions(799.89), Psychiatric disorder, and Seizure disorder ([facility] [address] . She also  has a past surgical history that includes Adenoidectomy; Appendectomy; gyn; and Tonsillectomy. Chart Reviewed: Yes  General Comment  Comments: patient's daughter at bedside  Subjective: pt more drowsy today reportedly due to meds. when she would rouse, increased utterance length compared to prior days; although minimally verbal and still poor intelligibility beyond word level. Behavior/Cognition: Pleasant mood; Doesn't follow directions  Communication Observation:  (impaired at baseline; advanced dementia; primary progressive aphasia)  Follows Directions: None    Prior Dysphagia History: see modified barium swallow study completed 11/17     Current Diet : NPO  Current Liquid Diet : NPO    Respiratory Status: Room air              Pain:   Patient does not appear in pain                                        OBJECTIVE    Patient Positioning: Upright in bed   Oral Motor   Oral Hygiene: Moist;Clean  Dentition: Adequate     Volitional Cough: Weak;Congested  Baseline Vocal Quality: Dysphonic;Weak    Oropharyngeal Phase:     Assessment Method(s): Observation;Palpation  Consistency Presented: Thin;Ice Chips  How Presented: Spoon;SLP-fed/Presented  Bolus Formation/Control: Impaired  Type of Impairment: Delayed  Propulsion: Delayed (# of seconds)  Oral Residue: None  Initiation of Swallow: Delayed (# of seconds)  Laryngeal Elevation: Decreased  Aspiration Signs/Symptoms: Weak cough;Delayed cough/throat clear   Patient intermittently rousing, smiling, and agreeable to limited trials via spoon. Accepted a few ice chips with appropriate mastication. Single swallow upon palpation. Intermittently demonstrated delayed, congested weak coughing. Voice clear after. Patient would state \"no\" or turn head away, drift off to sleep, then intermittently rouse and become agreeable again to further trials of ice and single trial of thin via tsp. Reviewed modified barium swallow study results and recommendations again with daughter. Discussed careful hand feeding strategies as potential alternative to artificial nutrition given patient's advanced dementia. Reviewed puree/thin as safest oral diet, although patient high aspiration risk. Discussed importance of oral care and reviewed compensatory strategies and aspiration precautions. Family very involved in patient's care and goal is to keep patient comfortable, although overall goals/plan still being discussed. Dysphagia exercises are not indicated; patient unable to follow commands. Thickened liquids not indicated based on MBSS findings. PLAN    Duration/Frequency: Continue to follow patient 1x/week for duration of hospitalization and/or until goals met  Dysphagia Outcome and Severity Scale (ANICETO)  Dysphagia Outcome Severity Scale: Level 2: Moderate Severe dysphagia- Maximum assistance or maximum use of strategies with partial PO only  Interpretation of Tool: The Dysphagia Outcome and Severity Scale (ANICETO) is a simple, easy-to-use, 7-point scale developed to systematically rate the functional severity of dysphagia based on objective assessment and make recommendations for diet level, independence level, and type of nutrition.    Normal(7), Functional(6), Mild(5), Mild-Moderate(4), Moderate(3), Moderate-Severe(2), Severe(1)    Speech Therapy Prognosis  Prognosis: Guarded    Education: Patient, Family member, Physician  Patient Education: modified barium swallow study results, aspiration risk, concerns, careful hand feeding strategies, and further discussion regarding goals. patient's daughter demonstrated understanding.   Patient Education Response: No evidence of learning    PRECAUTIONS/ALLERGIES: Sulfa antibiotics, Wasp venom protein, and Penicillins   Safety Devices in place: Yes  Type of devices: Call light within reach, Left in bed (patient's daughter at bedside)    Therapy Time  SLP Individual Minutes  Time In: 8931  Time Out: [id number]  Minutes: Ravi RomiKendall, [place]  11/18/2022 3:41 PM

## 2022-11-18 NOTE — PROGRESS NOTES
Hourly rounds completed during this shift. No signs of pain and discomfort at this time. Bed in low position and locked, safety precautions maintained, call bell in reach and personal items are in reach. Family members remain at the bedside. Will continue to monitor patient. Report will be given to night shift nurse.

## 2022-11-18 NOTE — PROGRESS NOTES
Reviewed notes    Noted patient responds minimally    Good discussions with staff and family on care goals

## 2022-11-18 NOTE — PROGRESS NOTES
VANCO DAILY FOLLOW UP NOTE  [facility] Pharmacokinetic Monitoring Service - Vancomycin    Consulting Provider: Dr. Jonathon Pineda   Indication: Aspiration PNA/UTI  Target Concentration: Goal AUC/ZANE 400-600 mg*hr/L  Day of Therapy: 2  Additional Antimicrobials: Ceftriaxone    Patient eligible for piperacillin-tazobactam to cefepime auto-substitution per P&T approved protocol? N/A    Pertinent Laboratory Values: Wt Readings from Last 1 Encounters:   11/17/22 127 lb (57.6 kg)     Temp Readings from Last 1 Encounters:   11/18/22 97.3 °F (36.3 °C) (Oral)     Recent Labs     11/16/22  0345 11/16/22  0347 11/17/22  0515 11/18/22  0510   BUN 6*  --  6* 8   CREATININE 0.50*  --  0.40* 0.40*   WBC 6.2  --  4.2* 4.1*   LACACIDPL  --  1.0  --   --      Estimated Creatinine Clearance: 116 mL/min (A) (based on SCr of 0.4 mg/dL (L)). No results found for: Zayda Adler    MRSA Nasal Swab: N/A. Non-respiratory infection. Assessment:  Date/Time Dose Concentration AUC         Note: Serum concentrations collected for AUC dosing may appear elevated if collected in close proximity to the dose administered, this is not necessarily an indication of toxicity    Plan:  Dosing recommendations based on Bayesian software  Started vancomycin 1250 mg X 1, then 1000 mg Q12H  Anticipated AUC of 455 and trough concentration of 12.7 at steady state  Renal labs as indicated   Vancomycin concentration ordered with AM labs on 11/19/22  Pharmacy will continue to monitor patient and adjust therapy as indicated    Thank you for the consult,  Keon Chung.  [address]

## 2022-11-18 NOTE — PROGRESS NOTES
ACUTE PHYSICAL THERAPY GOALS:   (Developed with and agreed upon by patient and/or caregiver. )  LTG:  (1.)Ms. Hugh Cornejo will move from supine to sit and sit to supine , scoot up and down, and roll side to side in bed with MODERATE ASSIST within 7 treatment day(s). (2.)Ms. Hugh Cornejo will transfer from bed to chair and chair to bed with MODERATE ASSIST x2 using the least restrictive device within 7 treatment day(s). (3.)Ms. Hugh Cornejo will ambulate with MAX ASSISTx2 for 50 feet with the least restrictive device within 7 treatment day(s). PHYSICAL THERAPY: Daily Note AM   (Link to Caseload Tracking: PT Visit Days : 2  Time In/Out PT Charge Capture  Rehab Caseload Tracker  Orders    Amari Cornejo is a 67 y.o. female   PRIMARY DIAGNOSIS: Acute UTI  Mild dehydration [E86.0]  Primary progressive aphasia ([address] [G31.01, F02.80]  Acute cystitis without hematuria [A56.29]  Acute metabolic encephalopathy [G25.19]  Dysphagia, unspecified type [R13.10]  Nausea and vomiting, unspecified vomiting type [R11.2]       Inpatient: Payor: MEDICARE / Plan: MEDICARE PART A AND B / Product Type: *No Product type* /     ASSESSMENT:     REHAB RECOMMENDATIONS:   Recommendation to date pending progress:  Setting:  No further skilled therapy after discharge from hospital    Equipment:    None     ASSESSMENT:  Ms. Hugh Cornejo is supine with family present. She is unable to follow any commands. We sat her up on the EOB Max A x2 and she had fair to poor sitting balance. She maintained good sitting balance at times but then would fall to the R with no instinct to correct. Attempted some ADL's but to no avail. She is clearly well taken care of but is really unable to participate in therapy cognitively. Patient is currently unable to swallow and medical decisions are active. Will continue to follow to assist with mobility as long as appropriate.      SUBJECTIVE:   Ms. Hugh Cornejo did not speak other than a whispered \"fine\" when asked how she was    Social/Functional Lives With: Spouse, Son  Type of Home: House  Receives Help From: Family  ADL Assistance: Needs assistance  Ambulation Assistance: Needs assistance  Transfer Assistance: Needs assistance  OBJECTIVE:     PAIN: VITALS / O2: PRECAUTION / Seferino James / Felicity Culveron:   Pre Treatment:          Post Treatment: 0 Vitals        Oxygen    IV    RESTRICTIONS/PRECAUTIONS:        MOBILITY: I Mod I S SBA CGA Min Mod Max Total  NT x2 Comments:   Bed Mobility    Rolling [] [] [] [] [] [] [] [] [] [] []    Supine to Sit [] [] [] [] [] [] [] [x] [] [] [x]    Scooting [] [] [] [] [] [] [] [] [x] [] []    Sit to Supine [] [] [] [] [] [] [] [x] [] [] [x]    Transfers    Sit to Stand [] [] [] [] [] [] [] [] [] [] []    Bed to Chair [] [] [] [] [] [] [] [] [] [] []    Stand to Sit [] [] [] [] [] [] [] [] [] [] []     [] [] [] [] [] [] [] [] [] [] []    I=Independent, Mod I=Modified Independent, S=Supervision, SBA=Standby Assistance, CGA=Contact Guard Assistance,   Min=Minimal Assistance, Mod=Moderate Assistance, Max=Maximal Assistance, Total=Total Assistance, NT=Not Tested    BALANCE: Good Fair+ Fair Fair- Poor NT Comments   Sitting Static [] [] [x] [x] [x] []    Sitting Dynamic [] [] [] [x] [x] []              Standing Static [] [] [] [] [] [x]    Standing Dynamic [] [] [] [] [] [x]      GAIT: I Mod I S SBA CGA Min Mod Max Total  NT x2 Comments:   Level of Assistance [] [] [] [] [] [] [] [] [] [x] []    Distance   feet    DME N/A    Gait Quality N/A    Weightbearing Status      Stairs      I=Independent, Mod I=Modified Independent, S=Supervision, SBA=Standby Assistance, CGA=Contact Guard Assistance,   Min=Minimal Assistance, Mod=Moderate Assistance, Max=Maximal Assistance, Total=Total Assistance, NT=Not Tested    PLAN:   FREQUENCY AND DURATION: 3 times/week for duration of hospital stay or until stated goals are met, whichever comes first.    TREATMENT:   TREATMENT:   Co-Treatment PT/OT necessary due to patient's decreased overall endurance/tolerance levels, as well as need for high level skilled assistance to complete functional transfers/mobility and functional tasks  Therapeutic Activity (25 Minutes): Therapeutic activity included Supine to Sit, Sit to Supine, Scooting, and Sitting balance  to improve functional Activity tolerance, Balance, Mobility, and Strength.     TREATMENT GRID:  N/A    AFTER TREATMENT PRECAUTIONS: Alarm Activated, Bed, Bed/Chair Locked, Call light within reach, RN notified, and Visitors at bedside    INTERDISCIPLINARY COLLABORATION:  RN/ PCT, PT/ PTA, and OT/ CERVANTES    EDUCATION:      TIME IN/OUT:  Time In: 0935  Time Out: 1000  Minutes: 25    Kellie Bonilla PTA

## 2022-11-18 NOTE — PROGRESS NOTES
ACUTE OCCUPATIONAL THERAPY GOALS:   (Developed with and agreed upon by patient and/or caregiver.)    1. Patient will complete upper body bathing and dressing with MINIMAL ASSIST and adaptive equipment as needed. 2. Patient will complete self-grooming tasks in unsupported sitting with MINIMAL ASSIST and adaptive equipment as needed. 3. Patient will tolerate 25 minutes of OT treatment with 1-2 rest breaks to increase activity tolerance for ADLs. 4. Patient will complete functional transfers with MODERATE ASSIST X 2 and adaptive equipment as needed. OCCUPATIONAL THERAPY: Daily Note AM   OT Visit Days: 1   Time In/Out  OT Charge Capture  Rehab Caseload Tracker  OT Orders    Amari Rey is a 67 y.o. female   PRIMARY DIAGNOSIS: Acute UTI  Mild dehydration [E86.0]  Primary progressive aphasia [address] [address] [G31.01, F02.80]  Acute cystitis without hematuria [T56.90]  Acute metabolic encephalopathy [X39.49]  Dysphagia, unspecified type [R13.10]  Nausea and vomiting, unspecified vomiting type [R11.2]       Inpatient: Payor: MEDICARE / Plan: MEDICARE PART A AND B / Product Type: *No Product type* /     ASSESSMENT:     REHAB RECOMMENDATIONS: CURRENT LEVEL OF FUNCTION:  (Most Recently Demonstrated)   Recommendation to date pending progress:  Setting:  Palliative care consult    Equipment:    To Be Determined Bathing:  Not Tested  Dressing: Total Assist  Feeding/Grooming: Total Assist  Toileting:  Not Tested  Functional Mobility:  Not Tested     ASSESSMENT:  Ms. Lea Rey participated in bed mobility with max a x 2. Pt maintained sitting balance part of the time but was falling to the right with no attempt to self correct. Pt did not follow any commands except reaching for the hand rail one time on command. Self care activities attempted but pt could not even initiate d/t mentation. Pt answered \"fine\" when asked how she was doing but any other attempted verbalization was mild mumbling.  Spoke with family, CM, and MD following session. Most likely follow up with palliative care. Will follow.        SUBJECTIVE:     Ms. Shari Shaffer states, \"Fine\"     Social/Functional Lives With: Spouse, Son  Type of Home: House  Receives Help From: Family  ADL Assistance: Needs assistance  Ambulation Assistance: Needs assistance  Transfer Assistance: Needs assistance    OBJECTIVE:     Dane Tse / Melly Artis / Lisette Augustinows: None    RESTRICTIONS/PRECAUTIONS:           PAIN: VITALS / O2:   Pre Treatment:    None indicated        Post Treatment: none Vitals          Oxygen        MOBILITY: I Mod I S SBA CGA Min Mod Max Total  NT x2 Comments:   Bed Mobility    Rolling [] [] [] [] [] [] [] [x] [] [] [x]    Supine to Sit [] [] [] [] [] [] [] [x] [] [] [x]    Scooting [] [] [] [] [] [] [] [x] [] [] [x]    Sit to Supine [] [] [] [] [] [] [] [x] [] [] [x]    Transfers    Sit to Stand [] [] [] [] [] [] [] [] [] [x] []    Bed to Chair [] [] [] [] [] [] [] [] [] [x] []    Stand to Sit [] [] [] [] [] [] [] [] [] [x] []    Tub/Shower [] [] [] [] [] [] [] [] [] [x] []     Toilet [] [] [] [] [] [] [] [] [] [x] []      [] [] [] [] [] [] [] [] [] [x] []    I=Independent, Mod I=Modified Independent, S=Supervision/Setup, SBA=Standby Assistance, CGA=Contact Guard Assistance, Min=Minimal Assistance, Mod=Moderate Assistance, Max=Maximal Assistance, Total=Total Assistance, NT=Not Tested    ACTIVITIES OF DAILY LIVING: I Mod I S SBA CGA Min Mod Max Total NT Comments   BASIC ADLs:              Upper Body   Bathing [] [] [] [] [] [] [] [] [] [x]    Lower Body Bathing [] [] [] [] [] [] [] [] [] [x]    Toileting [] [] [] [] [] [] [] [] [] [x]    Upper Body Dressing [] [] [] [] [] [] [] [] [] [x]    Lower Body Dressing [] [] [] [] [] [] [] [] [x] []    Feeding [] [] [] [] [] [] [] [] [] []    Grooming [] [] [] [] [] [] [] [] [x] []    Personal Device Care [] [] [] [] [] [] [] [] [x] []    Functional Mobility [] [] [] [] [] [] [] [] [] [x]    I=Independent, Mod I=Modified Independent, S=Supervision/Setup, SBA=Standby Assistance, CGA=Contact Guard Assistance, Min=Minimal Assistance, Mod=Moderate Assistance, Max=Maximal Assistance, Total=Total Assistance, NT=Not Tested    BALANCE: Good Fair+ Fair Fair- Poor NT Comments   Sitting Static [] [] [] [x] [x] []    Sitting Dynamic [] [] [] [x] [x] []              Standing Static [] [] [] [] [] [x]    Standing Dynamic [] [] [] [] [] [x]        PLAN:     FREQUENCY/DURATION   OT Plan of Care: 3 times/week for duration of hospital stay or until stated goals are met, whichever comes first.    TREATMENT:     TREATMENT:   Co-Treatment PT/OT necessary due to patient's decreased overall endurance/tolerance levels, as well as need for high level skilled assistance to complete functional transfers/mobility and functional tasks  Neuromuscular Re-education (25 Minutes): Neuromuscular Re-education included Balance Training, Postural training, Sitting balance training, and self care to improve Balance and Postural Control.     TREATMENT GRID:  N/A    AFTER TREATMENT PRECAUTIONS: Alarm Activated, Bed, Bed/Chair Locked, Call light within reach, Needs within reach, RN notified, Side rails x2, and Visitors at bedside    INTERDISCIPLINARY COLLABORATION:  MD/ PA/ NP , RN/ PCT, PT/ PTA, OT/ CERVANTES, and RN Case Manager/      EDUCATION:       TOTAL TREATMENT DURATION AND TIME:  Time In: 0935  Time Out: 1000  Minutes: CODY Nath

## 2022-11-18 NOTE — PROGRESS NOTES
Hospitalist Progress Note   Admit Date:  2022  3:18 AM   Name:  Frieda Rodriguez   Age:  67 y.o. Sex:  female  :  1950   MRN:  [id number]   Room:  [facility]    Presenting Complaint: Emesis     Reason(s) for Admission: Mild dehydration [E86.0]  Primary progressive aphasia ([address] [G31.01, F02.80]  Acute cystitis without hematuria [M29.81]  Acute metabolic encephalopathy [K45.60]  Dysphagia, unspecified type [R13.10]  Nausea and vomiting, unspecified vomiting type [R11.2]     Hospital Course:      Frieda Rodriguez is a 67 y.o. female with medical history of NEVAEH on CPAP who presented via EMS from home with nausea and vomiting at coffee-ground substance associated with constipation and no stool output of 1 week duration. EMS reported patient had a fever 102.4. Of note patient follows neurology Dr. Bisi Gibson and per note 22, progressive aphasia has been worse since  associated with frontotemporal lobe dementia. At some point she stayed at a nursing home/assisted living but now she lives with her  and son with total assist, diffuse difficulties communicating and ambulating. Per neurology's note 2022, reinitiate Aricept 10 mg nightly, continue Namenda 10 mg twice daily, Klonopin 0.25 mg twice daily. Off of Lamictal due to sedation. Keppra at current dose so low that is not treating her. On mainly vitamins and oils at home. In ED, CBC and CMP unremarkable. UA c/o UTI. Pt was started on Rocephin    Subjective & 24hr Events (22): Lethargic. Perked up when family said hello to her. Remains aphasic. Not following commands consistently. Family reports she coughed all night long on \"secretions\". Asking for drying agent to be given. Long d/w family regarding pathophysiology of her dysphagia. Discussed a feeding tube could be placed to improve nutrition but would not eliminate risk of aspiration. Discussed over time condition would continue to worsen.  Discussed patient's previous wishes which were to not have tube feeding. Discussed possible options for seizure prevention that did not involve swallowing    Assessment & Plan:     GNR UTI  Ucx: read changed this AM to GNR. Continue Rocephin. Follow for final    GPC pos blood culture  Blood cx: Coag negative staph 11/16 ? contaminant  Continue vanco for now started 11/17  Repeat blood cultures 11/17 pending    Progressive aphasia  Severe frontal temporal lobe dementia  History of seizure disorder  Patient follows neurology Dr. Uyen Alan and per note, aphasia has been worse since 2012 associated with frontotemporal lobe dementia. At baseline, she lives with her  and son with total assist, diffuse difficulties communicating and ambulating. Nonverbal mostly and not following much commands. Per neurology's note 9/2/2022, reinitiate Aricept 10 mg nightly, continue Namenda 10 mg twice daily, Klonopin 0.25 mg twice daily. Off of Lamictal due to sedation. Keppra at current dose so low that is not treating her. On mainly vitamins and oils at home  CT head on admission shows chronic findings  Continue supportive care  S/P MBS with delayed penetration on 11/17 11/18  Home meds on hold for NPO  Cont IV D5 1/2NS  Limit sedating meds  Delirium precautions  PT/OT  Add trial of scopalamine patch    Possible Aspiration Pneumonia  11/18 - temp 99.9, leukopenia, congestion in right lung on admission cxr  - add Flagyl, cont rocephin      Hypokalemia  11/18 - change IVF to d51/2 NS with KCL.  Repeat bmp in AM     Nausea and vomiting, resolved     Constipation  Cont bowel regimen, 4 stools reported 11/17     Ascending Aorta Aneurysm  Incidentally captured on CT chest measuring up to 4.1cm on admission  CTM outpatient, needs vascular surgery referral for continued surveillance pending goals of care     Hypothyroidism  Cont home Synthroid      NEVAEH  CPAP ill advised for risk aspiration         Anticipated discharge needs:      pending    Diet:  Diet NPO  DVT PPx: scd  Code status: Full Code    Hospital Problems:  Principal Problem:    Acute UTI  Active Problems:    Progressive aphasia    Seizure disorder ([address]    Frontotemporal dementia ([address]    Dehydration  Resolved Problems:    * No resolved hospital problems. *      Objective:   Patient Vitals for the past 24 hrs:   Temp Pulse Resp BP SpO2   11/18/22 1633 99.9 °F (37.7 °C) 81 18 113/66 91 %   11/18/22 1157 97.3 °F (36.3 °C) 92 18 128/78 91 %   11/18/22 0800 98.5 °F (36.9 °C) 95 17 (!) 146/93 93 %   11/18/22 0414 97.7 °F (36.5 °C) 87 18 118/85 92 %   11/17/22 2349 97.8 °F (36.6 °C) 83 20 118/87 93 %   11/17/22 2000 97.6 °F (36.4 °C) 83 18 117/76 93 %         Oxygen Therapy  SpO2: 91 %  O2 Device: None (Room air)    Estimated body mass index is 20.5 kg/m² as calculated from the following:    Height as of this encounter: 5' 6\" (1.676 m). Weight as of this encounter: 127 lb (57.6 kg). Intake/Output Summary (Last 24 hours) at 11/18/2022 1730  Last data filed at 11/18/2022 0905  Gross per 24 hour   Intake 10 ml   Output 300 ml   Net -290 ml           Physical Exam:     Blood pressure 113/66, pulse 81, temperature 99.9 °F (37.7 °C), temperature source Oral, resp. rate 18, height 5' 6\" (1.676 m), weight 127 lb (57.6 kg), SpO2 91 %. General:    Well nourished. Aphasic, lethargic, eyes open on command from daughters at bedside  Head:  Normocephalic, atraumatic  Eyes:  Sclerae appear normal.  Pupils equally round. ENT:  Nares appear normal, no drainage. Moist oral mucosa  Neck:  No restricted ROM. Trachea midline   CV:   RRR. No m/r/g. No jugular venous distension. Lungs:   Upper airway congestion, bases clear  Abdomen: Bowel sounds present. Soft, nontender, nondistended. Extremities: No cyanosis or clubbing. No edema  Skin:     No rashes and normal coloration. Warm and dry.       I have personally reviewed labs and tests showing:  Recent Labs:  Recent Results (from the past 48 hour(s))   Basic Metabolic Panel w/ Reflex to MG    Collection Time: 11/17/22  5:15 AM   Result Value Ref Range    Sodium 139 133 - 143 mmol/L    Potassium 3.3 (L) 3.5 - 5.1 mmol/L    Chloride 106 101 - 110 mmol/L    CO2 24 21 - 32 mmol/L    Anion Gap 9 2 - 11 mmol/L    Glucose 87 65 - 100 mg/dL    BUN 6 (L) 8 - 23 MG/DL    Creatinine 0.40 (L) 0.6 - 1.0 MG/DL    Est, Glom Filt Rate >60 >60 ml/min/1.73m2    Calcium 8.2 (L) 8.3 - 10.4 MG/DL   CBC with Auto Differential    Collection Time: 11/17/22  5:15 AM   Result Value Ref Range    WBC 4.2 (L) 4.3 - 11.1 K/uL    RBC 4.02 (L) 4.05 - 5.2 M/uL    Hemoglobin 11.8 11.7 - 15.4 g/dL    Hematocrit 36.0 35.8 - 46.3 %    MCV 89.6 82 - 102 FL    MCH 29.4 26.1 - 32.9 PG    MCHC 32.8 31.4 - 35.0 g/dL    RDW 13.9 11.9 - 14.6 %    Platelets 781 227 - 345 K/uL    MPV 9.3 (L) 9.4 - 12.3 FL    nRBC 0.00 0.0 - 0.2 K/uL    Differential Type AUTOMATED      Seg Neutrophils 62 43 - 78 %    Lymphocytes 15 13 - 44 %    Monocytes 11 4.0 - 12.0 %    Eosinophils % 10 (H) 0.5 - 7.8 %    Basophils 1 0.0 - 2.0 %    Immature Granulocytes 1 0.0 - 5.0 %    Segs Absolute 2.6 1.7 - 8.2 K/UL    Absolute Lymph # 0.6 0.5 - 4.6 K/UL    Absolute Mono # 0.4 0.1 - 1.3 K/UL    Absolute Eos # 0.4 0.0 - 0.8 K/UL    Basophils Absolute 0.0 0.0 - 0.2 K/UL    Absolute Immature Granulocyte 0.0 0.0 - 0.5 K/UL   Magnesium    Collection Time: 11/17/22  5:15 AM   Result Value Ref Range    Magnesium 2.0 1.8 - 2.4 mg/dL   Culture, Blood 1    Collection Time: 11/17/22  6:05 PM    Specimen: Blood   Result Value Ref Range    Special Requests RIGHT  HAND        Culture NO GROWTH AFTER 12 HOURS     Basic Metabolic Panel w/ Reflex to MG    Collection Time: 11/18/22  5:10 AM   Result Value Ref Range    Sodium 140 133 - 143 mmol/L    Potassium 3.2 (L) 3.5 - 5.1 mmol/L    Chloride 108 101 - 110 mmol/L    CO2 25 21 - 32 mmol/L    Anion Gap 7 2 - 11 mmol/L    Glucose 82 65 - 100 mg/dL    BUN 8 8 - 23 MG/DL    Creatinine 0.40 (L) 0.6 - 1.0 MG/DL Est, Glom Filt Rate >60 >60 ml/min/1.73m2    Calcium 9.2 8.3 - 10.4 MG/DL   CBC with Auto Differential    Collection Time: 11/18/22  5:10 AM   Result Value Ref Range    WBC 4.1 (L) 4.3 - 11.1 K/uL    RBC 4.04 (L) 4.05 - 5.2 M/uL    Hemoglobin 12.1 11.7 - 15.4 g/dL    Hematocrit 36.8 35.8 - 46.3 %    MCV 91.1 82 - 102 FL    MCH 30.0 26.1 - 32.9 PG    MCHC 32.9 31.4 - 35.0 g/dL    RDW 13.8 11.9 - 14.6 %    Platelets 823 301 - 550 K/uL    MPV 9.3 (L) 9.4 - 12.3 FL    nRBC 0.00 0.0 - 0.2 K/uL    Differential Type AUTOMATED      Seg Neutrophils 60 43 - 78 %    Lymphocytes 16 13 - 44 %    Monocytes 11 4.0 - 12.0 %    Eosinophils % 11 (H) 0.5 - 7.8 %    Basophils 1 0.0 - 2.0 %    Immature Granulocytes 1 0.0 - 5.0 %    Segs Absolute 2.5 1.7 - 8.2 K/UL    Absolute Lymph # 0.6 0.5 - 4.6 K/UL    Absolute Mono # 0.5 0.1 - 1.3 K/UL    Absolute Eos # 0.5 0.0 - 0.8 K/UL    Basophils Absolute 0.0 0.0 - 0.2 K/UL    Absolute Immature Granulocyte 0.0 0.0 - 0.5 K/UL   Magnesium    Collection Time: 11/18/22  5:10 AM   Result Value Ref Range    Magnesium 2.2 1.8 - 2.4 mg/dL       I have personally reviewed imaging studies showing: Other Studies:  FL MODIFIED BARIUM SWALLOW W VIDEO   Final Result   1. No evidence of aspiration. Residuals retained within the piriformis with   resultant delayed penetration. Please see full report from speech language   pathologist.         [facility]   Final Result   Unremarkable renal ultrasound. No hydronephrosis. XR ABDOMEN (KUB) (SINGLE AP VIEW)   Final Result   No acute abnormality. No findings present to suspect bowel   obstruction. CT HEAD WO CONTRAST   Final Result   1. No CT evidence of acute intracranial process. 2. Persistent ventricular dilatation, not significantly changed. CT CHEST PULMONARY EMBOLISM W CONTRAST   Final Result   1. No evidence of pulmonary embolism. 2. No acute intrathoracic process.       3. Aneurysmal dilatation of the ascending aorta measuring up to 4.1 cm         XR CHEST PORTABLE   Final Result   There is mild pulmonary vascular prominence which may represent congestion. This   is best appreciated on the right. No consolidation or pleural effusion.              Current Meds:  Current Facility-Administered Medications   Medication Dose Route Frequency    dextrose 5 % and 0.45 % NaCl with KCl 40 mEq infusion   IntraVENous Continuous    scopolamine (TRANSDERM-SCOP) transdermal patch 1 patch  1 patch TransDERmal Q72H    vancomycin (VANCOCIN) 1,000 mg in sodium chloride 0.9 % 250 mL IVPB ([id number])  1,000 mg IntraVENous Q12H    levothyroxine (SYNTHROID) tablet 100 mcg  100 mcg Oral Daily    memantine (NAMENDA) tablet 10 mg  10 mg Oral BID    sodium chloride flush 0.9 % injection 5-40 mL  5-40 mL IntraVENous 2 times per day    sodium chloride flush 0.9 % injection 5-40 mL  5-40 mL IntraVENous PRN    0.9 % sodium chloride infusion   IntraVENous PRN    ondansetron (ZOFRAN-ODT) disintegrating tablet 4 mg  4 mg Oral Q8H PRN    Or    ondansetron (ZOFRAN) injection 4 mg  4 mg IntraVENous Q6H PRN    polyethylene glycol (GLYCOLAX) packet 17 g  17 g Oral Daily PRN    acetaminophen (TYLENOL) tablet 650 mg  650 mg Oral Q6H PRN    Or    acetaminophen (TYLENOL) suppository 650 mg  650 mg Rectal Q6H PRN    pantoprazole (PROTONIX) 40 mg in sodium chloride (PF) 0.9 % 10 mL injection  40 mg IntraVENous Daily    cefTRIAXone (ROCEPHIN) 1,000 mg in sodium chloride 0.9 % 50 mL IVPB mini-bag  1,000 mg IntraVENous Q24H    aspirin EC tablet 81 mg  81 mg Oral Daily    clonazePAM (KLONOPIN) tablet 0.25 mg  0.25 mg Oral Q12H    bisacodyl (DULCOLAX) suppository 10 mg  10 mg Rectal Daily    polyethylene glycol (GLYCOLAX) packet 17 g  17 g Oral Daily    fluticasone (FLONASE) 50 MCG/ACT nasal spray 1 spray  1 spray Each Nostril Daily    LORazepam (ATIVAN) injection 1 mg  1 mg IntraVENous Q4H PRN       Signed:  Ligia Youngblood,     Part of this note may have been written by using a voice dictation software. The note has been proof read but may still contain some grammatical/other typographical errors.

## 2022-11-18 NOTE — PLAN OF CARE
Problem: Discharge Planning  Goal: Discharge to home or other facility with appropriate resources  Outcome: Progressing  Flowsheets (Taken 11/17/2022 0719 by Sofie Olivarez RN)  Discharge to home or other facility with appropriate resources: Identify barriers to discharge with patient and caregiver     Problem: Pain  Goal: Verbalizes/displays adequate comfort level or baseline comfort level  Outcome: Progressing  Flowsheets (Taken 11/17/2022 0716 by Sofie Olivarez RN)  Verbalizes/displays adequate comfort level or baseline comfort level: Encourage patient to monitor pain and request assistance     Problem: Skin/Tissue Integrity  Goal: Absence of new skin breakdown  Description: 1. Monitor for areas of redness and/or skin breakdown  2. Assess vascular access sites hourly  3. Every 4-6 hours minimum:  Change oxygen saturation probe site  4. Every 4-6 hours:  If on nasal continuous positive airway pressure, respiratory therapy assess nares and determine need for appliance change or resting period.   Outcome: Progressing     Problem: ABCDS Injury Assessment  Goal: Absence of physical injury  Outcome: Progressing  Flowsheets (Taken 11/17/2022 0716 by Sofie Olivarez RN)  Absence of Physical Injury: Implement safety measures based on patient assessment     Problem: Safety - Adult  Goal: Free from fall injury  Outcome: Progressing  Flowsheets (Taken 11/17/2022 0716 by Sofie Olivarez RN)  Free From Fall Injury: Instruct family/caregiver on patient safety

## 2022-11-19 ENCOUNTER — APPOINTMENT (OUTPATIENT)
Dept: GENERAL RADIOLOGY | Age: 72
DRG: 689 | End: 2022-11-19
Payer: MEDICARE

## 2022-11-19 LAB
BACTERIA SPEC CULT: ABNORMAL
BACTERIA SPEC CULT: ABNORMAL
BASOPHILS # BLD: 0 K/UL (ref 0–0.2)
BASOPHILS NFR BLD: 1 % (ref 0–2)
DIFFERENTIAL METHOD BLD: ABNORMAL
EOSINOPHIL # BLD: 0.4 K/UL (ref 0–0.8)
EOSINOPHIL NFR BLD: 12 % (ref 0.5–7.8)
ERYTHROCYTE [DISTWIDTH] IN BLOOD BY AUTOMATED COUNT: 13.9 % (ref 11.9–14.6)
HCT VFR BLD AUTO: 34.6 % (ref 35.8–46.3)
HGB BLD-MCNC: 11.3 G/DL (ref 11.7–15.4)
IMM GRANULOCYTES # BLD AUTO: 0 K/UL (ref 0–0.5)
IMM GRANULOCYTES NFR BLD AUTO: 0 % (ref 0–5)
LYMPHOCYTES # BLD: 0.6 K/UL (ref 0.5–4.6)
LYMPHOCYTES NFR BLD: 17 % (ref 13–44)
MCH RBC QN AUTO: 29.8 PG (ref 26.1–32.9)
MCHC RBC AUTO-ENTMCNC: 32.7 G/DL (ref 31.4–35)
MCV RBC AUTO: 91.3 FL (ref 82–102)
MONOCYTES # BLD: 0.4 K/UL (ref 0.1–1.3)
MONOCYTES NFR BLD: 11 % (ref 4–12)
NEUTS SEG # BLD: 2.2 K/UL (ref 1.7–8.2)
NEUTS SEG NFR BLD: 59 % (ref 43–78)
NRBC # BLD: 0 K/UL (ref 0–0.2)
PLATELET # BLD AUTO: 295 K/UL (ref 150–450)
PMV BLD AUTO: 9.2 FL (ref 9.4–12.3)
RBC # BLD AUTO: 3.79 M/UL (ref 4.05–5.2)
SERVICE CMNT-IMP: ABNORMAL
VANCOMYCIN SERPL-MCNC: 16.4 UG/ML
WBC # BLD AUTO: 3.7 K/UL (ref 4.3–11.1)

## 2022-11-19 PROCEDURE — A4216 STERILE WATER/SALINE, 10 ML: HCPCS | Performed by: FAMILY MEDICINE

## 2022-11-19 PROCEDURE — 71045 X-RAY EXAM CHEST 1 VIEW: CPT

## 2022-11-19 PROCEDURE — 1100000000 HC RM PRIVATE

## 2022-11-19 PROCEDURE — C9113 INJ PANTOPRAZOLE SODIUM, VIA: HCPCS | Performed by: FAMILY MEDICINE

## 2022-11-19 PROCEDURE — 99497 ADVNCD CARE PLAN 30 MIN: CPT | Performed by: INTERNAL MEDICINE

## 2022-11-19 PROCEDURE — 99222 1ST HOSP IP/OBS MODERATE 55: CPT | Performed by: INTERNAL MEDICINE

## 2022-11-19 PROCEDURE — 36415 COLL VENOUS BLD VENIPUNCTURE: CPT

## 2022-11-19 PROCEDURE — 6360000002 HC RX W HCPCS: Performed by: FAMILY MEDICINE

## 2022-11-19 PROCEDURE — 2500000003 HC RX 250 WO HCPCS: Performed by: INTERNAL MEDICINE

## 2022-11-19 PROCEDURE — 2580000003 HC RX 258: Performed by: INTERNAL MEDICINE

## 2022-11-19 PROCEDURE — 2580000003 HC RX 258: Performed by: FAMILY MEDICINE

## 2022-11-19 PROCEDURE — 6360000002 HC RX W HCPCS: Performed by: INTERNAL MEDICINE

## 2022-11-19 PROCEDURE — 6370000000 HC RX 637 (ALT 250 FOR IP): Performed by: FAMILY MEDICINE

## 2022-11-19 PROCEDURE — 85025 COMPLETE CBC W/AUTO DIFF WBC: CPT

## 2022-11-19 PROCEDURE — 80202 ASSAY OF VANCOMYCIN: CPT

## 2022-11-19 RX ADMIN — METRONIDAZOLE 500 MG: 500 INJECTION, SOLUTION INTRAVENOUS at 08:59

## 2022-11-19 RX ADMIN — SODIUM CHLORIDE 40 MG: 9 INJECTION, SOLUTION INTRAMUSCULAR; INTRAVENOUS; SUBCUTANEOUS at 08:59

## 2022-11-19 RX ADMIN — VANCOMYCIN HYDROCHLORIDE 1000 MG: 1 INJECTION, POWDER, LYOPHILIZED, FOR SOLUTION INTRAVENOUS at 00:30

## 2022-11-19 RX ADMIN — FLUTICASONE PROPIONATE 1 SPRAY: 50 SPRAY, METERED NASAL at 08:59

## 2022-11-19 RX ADMIN — SODIUM CHLORIDE, PRESERVATIVE FREE 10 ML: 5 INJECTION INTRAVENOUS at 20:07

## 2022-11-19 RX ADMIN — METRONIDAZOLE 500 MG: 500 INJECTION, SOLUTION INTRAVENOUS at 20:01

## 2022-11-19 RX ADMIN — POTASSIUM CHLORIDE, DEXTROSE MONOHYDRATE AND SODIUM CHLORIDE: 300; 5; 450 INJECTION, SOLUTION INTRAVENOUS at 15:13

## 2022-11-19 RX ADMIN — CEFTRIAXONE 1000 MG: 1 INJECTION, POWDER, FOR SOLUTION INTRAMUSCULAR; INTRAVENOUS at 05:52

## 2022-11-19 RX ADMIN — SODIUM CHLORIDE, PRESERVATIVE FREE 10 ML: 5 INJECTION INTRAVENOUS at 09:00

## 2022-11-19 ASSESSMENT — PAIN SCALES - GENERAL: PAINLEVEL_OUTOF10: 0

## 2022-11-19 NOTE — PROGRESS NOTES
[facility]    Liaison to bedside for follow up. [address] liaison Martina Ruelas met with patient and family yesterday and discussed hospice at home. Patient's spouse took my card and will reach back out when his son and daughter are at bedside.     Thank you for this referral,  Audie Jackson RN, BSN  [address] liaison  [phone]

## 2022-11-19 NOTE — PLAN OF CARE
Problem: Discharge Planning  Goal: Discharge to home or other facility with appropriate resources  Outcome: Progressing  Flowsheets (Taken 11/18/2022 5027 by Eliud Dunlap RN)  Discharge to home or other facility with appropriate resources: Identify barriers to discharge with patient and caregiver     Problem: Pain  Goal: Verbalizes/displays adequate comfort level or baseline comfort level  Outcome: Progressing     Problem: Skin/Tissue Integrity  Goal: Absence of new skin breakdown  Description: 1. Monitor for areas of redness and/or skin breakdown  2. Assess vascular access sites hourly  3. Every 4-6 hours minimum:  Change oxygen saturation probe site  4. Every 4-6 hours:  If on nasal continuous positive airway pressure, respiratory therapy assess nares and determine need for appliance change or resting period.   Outcome: Progressing     Problem: ABCDS Injury Assessment  Goal: Absence of physical injury  Outcome: Progressing     Problem: Safety - Adult  Goal: Free from fall injury  Outcome: Progressing

## 2022-11-19 NOTE — PROGRESS NOTES
Hospitalist Progress Note   Admit Date:  2022  3:18 AM   Name:  Tavon Mayers   Age:  67 y.o. Sex:  female  :  1950   MRN:  [id number]   Room:  [facility]/    Presenting Complaint: Emesis     Reason(s) for Admission: Mild dehydration [E86.0]  Primary progressive aphasia ([address] [G31.01, F02.80]  Acute cystitis without hematuria [J40.49]  Acute metabolic encephalopathy [M65.83]  Dysphagia, unspecified type [R13.10]  Nausea and vomiting, unspecified vomiting type [R11.2]     Hospital Course:      Tavon Mayers is a 67 y.o. female with medical history of NEVAEH on CPAP who presented via EMS from home with nausea and vomiting at coffee-ground substance associated with constipation and no stool output of 1 week duration. EMS reported patient had a fever 102.4. Of note patient follows neurology Dr. Hugh Esteves and per note 22, progressive aphasia has been worse since  associated with frontotemporal lobe dementia. At some point she stayed at a nursing home/assisted living but now she lives with her  and son with total assist, diffuse difficulties communicating and ambulating. Per neurology's note 2022, reinitiate Aricept 10 mg nightly, continue Namenda 10 mg twice daily, Klonopin 0.25 mg twice daily. Off of Lamictal due to sedation. Keppra at current dose so low that is not treating her. On mainly vitamins and oils at home. In ED, CBC and CMP unremarkable. UA c/o UTI. Pt was started on Rocephin    Subjective & 24hr Events (22): Pt more alert today. Cough improved with fewer secretions after addition of scopalamine patch. Assessment & Plan:     Vargas Tracy  - continue Rocephin D3    CoNS pos blood culture  - suspected to be contaminant    Progressive aphasia  Severe frontal temporal lobe dementia  History of seizure disorder  Patient follows neurology Dr. Hugh Esteves and per note, aphasia has been worse since  associated with frontotemporal lobe dementia.   At baseline, she lives with her  and son with total assist, diffuse difficulties communicating and ambulating. Nonverbal mostly and not following much commands. Per neurology's note 9/2/2022, reinitiate Aricept 10 mg nightly, continue Namenda 10 mg twice daily, Klonopin 0.25 mg twice daily. Off of Lamictal due to sedation. Keppra at current dose so low that is not treating her. On mainly vitamins and oils at home  CT head on admission shows chronic findings  Continue supportive care  S/P MBS with delayed penetration on 11/17 11/19  Home meds on hold for NPO  Cont IV D5 1/2NS  Limit sedating meds  Delirium precautions  PT/OT/ speech  scopalamine patch  Family requested comfort feeds. Long d/w family regarding what that entails. They are willing to accept risks. PC/Hospice following.  is not wishing to pursue tube feeding at this time as it was not in keeping with his wife's wishes. Addendum -  wanting to pursue [facility] [phone] with [facility]    Possible Aspiration Pneumonia  11/18 - temp 99.9, leukopenia, congestion in right lung on admission cxr  11/19 - CXR today w/o infiltrate. Continue Rocephin D3/flagyl D2      Hypokalemia  11/18 - change IVF to d51/2 NS with KCL. Repeat bmp in AM     Nausea and vomiting, resolved     Constipation  Cont bowel regimen, 4 stools reported 11/17     Ascending Aorta Aneurysm  Incidentally captured on CT chest measuring up to 4.1cm on admission  CTM outpatient, needs vascular surgery referral for continued surveillance pending goals of care     Hypothyroidism  Cont home Synthroid      NEVAEH  CPAP ill advised for risk aspiration         Anticipated discharge needs:      pending    Diet:  ADULT DIET;  Dysphagia - Pureed  DVT PPx: scd  Code status: DNR    Hospital Problems:  Principal Problem:    Acute UTI  Active Problems:    Progressive aphasia    Seizure disorder ([address]    Frontotemporal dementia ([address]    Dehydration    Aspiration pneumonia [facility])  Resolved Problems:    * No resolved hospital problems. *      Objective:   Patient Vitals for the past 24 hrs:   Temp Pulse Resp BP SpO2   11/19/22 1618 98.4 °F (36.9 °C) 78 18 126/79 96 %   11/19/22 1120 98.6 °F (37 °C) 53 18 (!) 122/107 94 %   11/19/22 0724 98.1 °F (36.7 °C) 72 18 97/74 93 %   11/19/22 0305 98.6 °F (37 °C) 64 18 108/69 94 %   11/18/22 2359 98.8 °F (37.1 °C) 72 20 110/69 94 %   11/18/22 1946 99.5 °F (37.5 °C) 84 18 124/83 94 %         Oxygen Therapy  SpO2: 96 %  O2 Device: None (Room air)    Estimated body mass index is 20.5 kg/m² as calculated from the following:    Height as of this encounter: 5' 6\" (1.676 m). Weight as of this encounter: 127 lb (57.6 kg). Intake/Output Summary (Last 24 hours) at 11/19/2022 1736  Last data filed at 11/19/2022 1426  Gross per 24 hour   Intake --   Output 450 ml   Net -450 ml           Physical Exam:     Blood pressure 126/79, pulse 78, temperature 98.4 °F (36.9 °C), temperature source Oral, resp. rate 18, height 5' 6\" (1.676 m), weight 127 lb (57.6 kg), SpO2 96 %. General:    Well nourished. Aphasic, alert  Head:  Normocephalic, atraumatic  Eyes:  Sclerae appear normal.  Pupils equally round. ENT:  Nares appear normal, no drainage. Moist oral mucosa  Neck:  No restricted ROM. Trachea midline   CV:   RRR. No m/r/g. No jugular venous distension. Lungs:   Upper airway congestion, bases clear  Abdomen: Bowel sounds present. Soft, nontender, nondistended. Extremities: No cyanosis or clubbing. No edema  Skin:     No rashes and normal coloration. Warm and dry.       I have personally reviewed labs and tests showing:  Recent Labs:  Recent Results (from the past 48 hour(s))   Culture, Blood 1    Collection Time: 11/17/22  6:05 PM    Specimen: Blood   Result Value Ref Range    Special Requests RIGHT  HAND        Culture NO GROWTH 2 DAYS     Basic Metabolic Panel w/ Reflex to MG    Collection Time: 11/18/22  5:10 AM   Result Value Ref Range Sodium 140 133 - 143 mmol/L    Potassium 3.2 (L) 3.5 - 5.1 mmol/L    Chloride 108 101 - 110 mmol/L    CO2 25 21 - 32 mmol/L    Anion Gap 7 2 - 11 mmol/L    Glucose 82 65 - 100 mg/dL    BUN 8 8 - 23 MG/DL    Creatinine 0.40 (L) 0.6 - 1.0 MG/DL    Est, Glom Filt Rate >60 >60 ml/min/1.73m2    Calcium 9.2 8.3 - 10.4 MG/DL   CBC with Auto Differential    Collection Time: 11/18/22  5:10 AM   Result Value Ref Range    WBC 4.1 (L) 4.3 - 11.1 K/uL    RBC 4.04 (L) 4.05 - 5.2 M/uL    Hemoglobin 12.1 11.7 - 15.4 g/dL    Hematocrit 36.8 35.8 - 46.3 %    MCV 91.1 82 - 102 FL    MCH 30.0 26.1 - 32.9 PG    MCHC 32.9 31.4 - 35.0 g/dL    RDW 13.8 11.9 - 14.6 %    Platelets 124 921 - 779 K/uL    MPV 9.3 (L) 9.4 - 12.3 FL    nRBC 0.00 0.0 - 0.2 K/uL    Differential Type AUTOMATED      Seg Neutrophils 60 43 - 78 %    Lymphocytes 16 13 - 44 %    Monocytes 11 4.0 - 12.0 %    Eosinophils % 11 (H) 0.5 - 7.8 %    Basophils 1 0.0 - 2.0 %    Immature Granulocytes 1 0.0 - 5.0 %    Segs Absolute 2.5 1.7 - 8.2 K/UL    Absolute Lymph # 0.6 0.5 - 4.6 K/UL    Absolute Mono # 0.5 0.1 - 1.3 K/UL    Absolute Eos # 0.5 0.0 - 0.8 K/UL    Basophils Absolute 0.0 0.0 - 0.2 K/UL    Absolute Immature Granulocyte 0.0 0.0 - 0.5 K/UL   Magnesium    Collection Time: 11/18/22  5:10 AM   Result Value Ref Range    Magnesium 2.2 1.8 - 2.4 mg/dL   Comprehensive Metabolic Panel    Collection Time: 11/18/22  8:55 PM   Result Value Ref Range    Sodium 139 133 - 143 mmol/L    Potassium 3.6 3.5 - 5.1 mmol/L    Chloride 109 101 - 110 mmol/L    CO2 24 21 - 32 mmol/L    Anion Gap 6 2 - 11 mmol/L    Glucose 102 (H) 65 - 100 mg/dL    BUN 6 (L) 8 - 23 MG/DL    Creatinine 0.40 (L) 0.6 - 1.0 MG/DL    Est, Glom Filt Rate >60 >60 ml/min/1.73m2    Calcium 9.0 8.3 - 10.4 MG/DL    Total Bilirubin 0.3 0.2 - 1.1 MG/DL    ALT 19 12 - 65 U/L    AST 25 15 - 37 U/L    Alk Phosphatase 58 50 - 136 U/L    Total Protein 5.8 (L) 6.3 - 8.2 g/dL    Albumin 2.5 (L) 3.2 - 4.6 g/dL    Globulin 3.3 2.8 - 4.5 g/dL    Albumin/Globulin Ratio 0.8 0.4 - 1.6     CBC with Auto Differential    Collection Time: 11/19/22  5:40 AM   Result Value Ref Range    WBC 3.7 (L) 4.3 - 11.1 K/uL    RBC 3.79 (L) 4.05 - 5.2 M/uL    Hemoglobin 11.3 (L) 11.7 - 15.4 g/dL    Hematocrit 34.6 (L) 35.8 - 46.3 %    MCV 91.3 82 - 102 FL    MCH 29.8 26.1 - 32.9 PG    MCHC 32.7 31.4 - 35.0 g/dL    RDW 13.9 11.9 - 14.6 %    Platelets 857 706 - 604 K/uL    MPV 9.2 (L) 9.4 - 12.3 FL    nRBC 0.00 0.0 - 0.2 K/uL    Differential Type AUTOMATED      Seg Neutrophils 59 43 - 78 %    Lymphocytes 17 13 - 44 %    Monocytes 11 4.0 - 12.0 %    Eosinophils % 12 (H) 0.5 - 7.8 %    Basophils 1 0.0 - 2.0 %    Immature Granulocytes 0 0.0 - 5.0 %    Segs Absolute 2.2 1.7 - 8.2 K/UL    Absolute Lymph # 0.6 0.5 - 4.6 K/UL    Absolute Mono # 0.4 0.1 - 1.3 K/UL    Absolute Eos # 0.4 0.0 - 0.8 K/UL    Basophils Absolute 0.0 0.0 - 0.2 K/UL    Absolute Immature Granulocyte 0.0 0.0 - 0.5 K/UL   Vancomycin Level, Random    Collection Time: 11/19/22  5:40 AM   Result Value Ref Range    Vancomycin Rm 16.4 UG/ML       I have personally reviewed imaging studies showing: Other Studies:  XR CHEST PORTABLE   Final Result   Unremarkable portable chest radiograph. FL MODIFIED BARIUM SWALLOW W VIDEO   Final Result   1. No evidence of aspiration. Residuals retained within the piriformis with   resultant delayed penetration. Please see full report from speech language   pathologist.         [address] [facility]   Final Result   Unremarkable renal ultrasound. No hydronephrosis. XR ABDOMEN (KUB) (SINGLE AP VIEW)   Final Result   No acute abnormality. No findings present to suspect bowel   obstruction. CT HEAD WO CONTRAST   Final Result   1. No CT evidence of acute intracranial process. 2. Persistent ventricular dilatation, not significantly changed. CT CHEST PULMONARY EMBOLISM W CONTRAST   Final Result   1.  No evidence of pulmonary embolism. 2. No acute intrathoracic process. 3. Aneurysmal dilatation of the ascending aorta measuring up to 4.1 cm         XR CHEST PORTABLE   Final Result   There is mild pulmonary vascular prominence which may represent congestion. This   is best appreciated on the right. No consolidation or pleural effusion.              Current Meds:  Current Facility-Administered Medications   Medication Dose Route Frequency    dextrose 5 % and 0.45 % NaCl with KCl 40 mEq infusion   IntraVENous Continuous    scopolamine (TRANSDERM-SCOP) transdermal patch 1 patch  1 patch TransDERmal Q72H    metronidazole (FLAGYL) 500 mg in 0.9% NaCl 100 mL IVPB premix  500 mg IntraVENous Q12H    [Held by provider] levothyroxine (SYNTHROID) tablet 100 mcg  100 mcg Oral Daily    [Held by provider] memantine (NAMENDA) tablet 10 mg  10 mg Oral BID    sodium chloride flush 0.9 % injection 5-40 mL  5-40 mL IntraVENous 2 times per day    sodium chloride flush 0.9 % injection 5-40 mL  5-40 mL IntraVENous PRN    0.9 % sodium chloride infusion   IntraVENous PRN    ondansetron (ZOFRAN-ODT) disintegrating tablet 4 mg  4 mg Oral Q8H PRN    Or    ondansetron (ZOFRAN) injection 4 mg  4 mg IntraVENous Q6H PRN    polyethylene glycol (GLYCOLAX) packet 17 g  17 g Oral Daily PRN    acetaminophen (TYLENOL) tablet 650 mg  650 mg Oral Q6H PRN    Or    acetaminophen (TYLENOL) suppository 650 mg  650 mg Rectal Q6H PRN    pantoprazole (PROTONIX) 40 mg in sodium chloride (PF) 0.9 % 10 mL injection  40 mg IntraVENous Daily    cefTRIAXone (ROCEPHIN) 1,000 mg in sodium chloride 0.9 % 50 mL IVPB mini-bag  1,000 mg IntraVENous Q24H    [Held by provider] aspirin EC tablet 81 mg  81 mg Oral Daily    [Held by provider] clonazePAM (KLONOPIN) tablet 0.25 mg  0.25 mg Oral Q12H    polyethylene glycol (GLYCOLAX) packet 17 g  17 g Oral Daily    fluticasone (FLONASE) 50 MCG/ACT nasal spray 1 spray  1 spray Each Nostril Daily    LORazepam (ATIVAN) injection 1 mg  1 mg IntraVENous Q4H PRN       Signed:  Lisette Body, DO    Part of this note may have been written by using a voice dictation software. The note has been proof read but may still contain some grammatical/other typographical errors.

## 2022-11-19 NOTE — CONSULTS
Patient: Carmencita Ann MRN: [id number]  SSN: [id number]    YOB: 1950  Age: 67 y.o. Sex: female       Date of Request: 11/19/2022  Date of Consult:  11/19/2022  Reason for Consult:  family support and education and goals of care  Requesting Physician: Dr. Maddy Lassiter     Assessment/Plan:     Principal Diagnosis:    Dysphagia  R13.10    Additional Diagnoses:   Debility, Unspecified  R53.81  Frailty  R54  Counseling, Encounter for Medical Advice  Z71.9  Encounter for Palliative Care  Z51.5    Palliative Performance Scale (PPS):       Medical Decision Making:   Reviewed and summarized labs and imaging from admission. Pt with aphasia, unable to participate in conversation. Spoke with spouse and daughter at bedside. We reviewed ongoing care and concerns given dysphagia. We reviewed comfort feedings with potential for aspiration. Explained pt likely to aspirate own secretions even if not eating eventually leading to respiratory failure. We also discussed home hospice vs palliative care. Family will take time and discuss together what next steps to take. DNR is in place. Will follow    I spent greater than 25 mins on advanced care planning. Will discuss findings with members of the interdisciplinary team.      Thank you for this referral.         Subjective:     History obtained from:  Family and Chart    Chief Complaint: dysphagia. History of Present Illness:  67 y.o. female with medical history of NEVAEH on CPAP who presented via EMS from home with nausea and vomiting at coffee-ground substance associated with constipation and no stool output of 1 week duration. EMS reported patient had a fever 102.4. Of note patient follows neurology Dr. Radha Esteves and per note 9/12/22, progressive aphasia has been worse since 2012 associated with frontotemporal lobe dementia.   At some point she stayed at a nursing home/assisted living but now she lives with her  and son with total assist, diffuse difficulties communicating and ambulating. Per neurology's note 9/2/2022, reinitiate Aricept 10 mg nightly, continue Namenda 10 mg twice daily, Klonopin 0.25 mg twice daily. Off of Lamictal due to sedation. Keppra at current dose so low that is not treating her. On mainly vitamins and oils at home. Advance Directive: Yes       Code Status:  DNR            Health Care Power of : pt spouse is legal decision maker    Past Medical History:   Diagnosis Date    Fibroid, uterine     Other ill-defined conditions(799.89)     Hypothyroidism    Psychiatric disorder     Depression    Seizure disorder ([facility] [address] 11/16/2022      Past Surgical History:   Procedure Laterality Date    ADENOIDECTOMY      APPENDECTOMY      GYN      Partial Hysterectomy    TONSILLECTOMY       Family History   Problem Relation Age of Onset    Cancer Mother     Heart Disease Father       Social History     Tobacco Use    Smoking status: Never    Smokeless tobacco: Never   Substance Use Topics    Alcohol use: Yes     Prior to Admission medications    Medication Sig Start Date End Date Taking? Authorizing Provider   aspirin 81 MG chewable tablet Take 81 mg by mouth daily   Yes Historical Provider, MD   donepezil (ARICEPT) 10 MG tablet Take 10 mg by mouth nightly  Patient not taking: Reported on 11/16/2022    Historical Provider, MD   clonazePAM (KLONOPIN) 0.5 MG tablet Take 0.25 mg by mouth 2 times daily as needed.  2/11/22   Ar Automatic Reconciliation   levETIRAcetam (KEPPRA) 100 MG/ML solution Take 20 mg by mouth 2 times daily Family has weaned pt 9/8/21   Ar Automatic Reconciliation   levothyroxine (SYNTHROID) 75 MCG tablet Take 100 mcg by mouth daily 9/8/21   Ar Automatic Reconciliation   memantine (NAMENDA) 10 MG tablet Take 10 mg by mouth 2 times daily 9/8/21   Ar Automatic Reconciliation       Allergies   Allergen Reactions    Sulfa Antibiotics Other (See Comments)     Hematuria    Wasp Venom Protein Hives and Swelling    Penicillins Rash Comprehensive review of systems unable to obtain due to mentation. Objective:     Visit Vitals  BP 97/74   Pulse 72   Temp 98.1 °F (36.7 °C) (Axillary)   Resp 18   Ht 5' 6\" (1.676 m)   Wt 127 lb (57.6 kg)   SpO2 93%   BMI 20.50 kg/m²        Physical Exam:    General:  No acute distress. Eyes:  Conjunctivae/corneas clear    Nose: Nares normal. Septum midline. Neck: Supple, symmetrical, trachea midline, no JVD   Lungs:   Clear to auscultation bilaterally, unlabored   Heart:  Regular rate and rhythm, no murmur    Abdomen:   Soft, non-tender, non-distended. Positive bowel sounds   Extremities: Normal, atraumatic, no cyanosis or edema   Skin: Skin color, texture, turgor normal. No rash or lesions.    Neurologic: Aphasic    Psych: Appears comfortable       Signed By: Lilia Samaniego MD     November 19, 2022

## 2022-11-20 LAB
ALBUMIN SERPL-MCNC: 2.7 G/DL (ref 3.2–4.6)
ALBUMIN/GLOB SERPL: 0.7 {RATIO} (ref 0.4–1.6)
ALP SERPL-CCNC: 65 U/L (ref 50–136)
ALT SERPL-CCNC: 22 U/L (ref 12–65)
ANION GAP SERPL CALC-SCNC: 5 MMOL/L (ref 2–11)
AST SERPL-CCNC: 30 U/L (ref 15–37)
BASOPHILS # BLD: 0 K/UL (ref 0–0.2)
BASOPHILS NFR BLD: 1 % (ref 0–2)
BILIRUB SERPL-MCNC: 0.4 MG/DL (ref 0.2–1.1)
BUN SERPL-MCNC: 3 MG/DL (ref 8–23)
CALCIUM SERPL-MCNC: 9.1 MG/DL (ref 8.3–10.4)
CHLORIDE SERPL-SCNC: 104 MMOL/L (ref 101–110)
CO2 SERPL-SCNC: 24 MMOL/L (ref 21–32)
CREAT SERPL-MCNC: 0.4 MG/DL (ref 0.6–1)
DIFFERENTIAL METHOD BLD: ABNORMAL
EOSINOPHIL # BLD: 0.5 K/UL (ref 0–0.8)
EOSINOPHIL NFR BLD: 10 % (ref 0.5–7.8)
ERYTHROCYTE [DISTWIDTH] IN BLOOD BY AUTOMATED COUNT: 13.5 % (ref 11.9–14.6)
GLOBULIN SER CALC-MCNC: 3.8 G/DL (ref 2.8–4.5)
GLUCOSE SERPL-MCNC: 111 MG/DL (ref 65–100)
HCT VFR BLD AUTO: 37.2 % (ref 35.8–46.3)
HGB BLD-MCNC: 12.3 G/DL (ref 11.7–15.4)
IMM GRANULOCYTES # BLD AUTO: 0 K/UL (ref 0–0.5)
IMM GRANULOCYTES NFR BLD AUTO: 0 % (ref 0–5)
LYMPHOCYTES # BLD: 0.8 K/UL (ref 0.5–4.6)
LYMPHOCYTES NFR BLD: 17 % (ref 13–44)
MCH RBC QN AUTO: 29.5 PG (ref 26.1–32.9)
MCHC RBC AUTO-ENTMCNC: 33.1 G/DL (ref 31.4–35)
MCV RBC AUTO: 89.2 FL (ref 82–102)
MONOCYTES # BLD: 0.5 K/UL (ref 0.1–1.3)
MONOCYTES NFR BLD: 11 % (ref 4–12)
NEUTS SEG # BLD: 3 K/UL (ref 1.7–8.2)
NEUTS SEG NFR BLD: 61 % (ref 43–78)
NRBC # BLD: 0 K/UL (ref 0–0.2)
PLATELET # BLD AUTO: 314 K/UL (ref 150–450)
PMV BLD AUTO: 9.3 FL (ref 9.4–12.3)
POTASSIUM SERPL-SCNC: 3.9 MMOL/L (ref 3.5–5.1)
PROT SERPL-MCNC: 6.5 G/DL (ref 6.3–8.2)
RBC # BLD AUTO: 4.17 M/UL (ref 4.05–5.2)
SODIUM SERPL-SCNC: 133 MMOL/L (ref 133–143)
WBC # BLD AUTO: 4.8 K/UL (ref 4.3–11.1)

## 2022-11-20 PROCEDURE — 80053 COMPREHEN METABOLIC PANEL: CPT

## 2022-11-20 PROCEDURE — 6360000002 HC RX W HCPCS: Performed by: HOSPITALIST

## 2022-11-20 PROCEDURE — 6360000002 HC RX W HCPCS: Performed by: INTERNAL MEDICINE

## 2022-11-20 PROCEDURE — 85025 COMPLETE CBC W/AUTO DIFF WBC: CPT

## 2022-11-20 PROCEDURE — 2580000003 HC RX 258: Performed by: INTERNAL MEDICINE

## 2022-11-20 PROCEDURE — 2500000003 HC RX 250 WO HCPCS: Performed by: INTERNAL MEDICINE

## 2022-11-20 PROCEDURE — 36415 COLL VENOUS BLD VENIPUNCTURE: CPT

## 2022-11-20 PROCEDURE — 1100000000 HC RM PRIVATE

## 2022-11-20 PROCEDURE — 6360000002 HC RX W HCPCS: Performed by: FAMILY MEDICINE

## 2022-11-20 PROCEDURE — 2580000003 HC RX 258: Performed by: FAMILY MEDICINE

## 2022-11-20 PROCEDURE — A4216 STERILE WATER/SALINE, 10 ML: HCPCS | Performed by: FAMILY MEDICINE

## 2022-11-20 PROCEDURE — C9113 INJ PANTOPRAZOLE SODIUM, VIA: HCPCS | Performed by: FAMILY MEDICINE

## 2022-11-20 RX ORDER — MORPHINE SULFATE 2 MG/ML
2 INJECTION, SOLUTION INTRAMUSCULAR; INTRAVENOUS EVERY 4 HOURS PRN
Status: DISCONTINUED | OUTPATIENT
Start: 2022-11-20 | End: 2022-11-21 | Stop reason: HOSPADM

## 2022-11-20 RX ORDER — LORAZEPAM 2 MG/ML
0.5 INJECTION INTRAMUSCULAR EVERY 4 HOURS PRN
Status: DISCONTINUED | OUTPATIENT
Start: 2022-11-20 | End: 2022-11-21 | Stop reason: HOSPADM

## 2022-11-20 RX ADMIN — SODIUM CHLORIDE, PRESERVATIVE FREE 10 ML: 5 INJECTION INTRAVENOUS at 10:04

## 2022-11-20 RX ADMIN — POTASSIUM CHLORIDE, DEXTROSE MONOHYDRATE AND SODIUM CHLORIDE: 300; 5; 450 INJECTION, SOLUTION INTRAVENOUS at 05:45

## 2022-11-20 RX ADMIN — SODIUM CHLORIDE 250 MG: 9 INJECTION, SOLUTION INTRAVENOUS at 22:17

## 2022-11-20 RX ADMIN — SODIUM CHLORIDE 250 MG: 9 INJECTION, SOLUTION INTRAVENOUS at 10:19

## 2022-11-20 RX ADMIN — METRONIDAZOLE 500 MG: 500 INJECTION, SOLUTION INTRAVENOUS at 21:05

## 2022-11-20 RX ADMIN — MORPHINE SULFATE 2 MG: 2 INJECTION, SOLUTION INTRAMUSCULAR; INTRAVENOUS at 21:52

## 2022-11-20 RX ADMIN — SODIUM CHLORIDE, PRESERVATIVE FREE 10 ML: 5 INJECTION INTRAVENOUS at 22:16

## 2022-11-20 RX ADMIN — POTASSIUM CHLORIDE, DEXTROSE MONOHYDRATE AND SODIUM CHLORIDE: 300; 5; 450 INJECTION, SOLUTION INTRAVENOUS at 19:31

## 2022-11-20 RX ADMIN — SODIUM CHLORIDE 40 MG: 9 INJECTION, SOLUTION INTRAMUSCULAR; INTRAVENOUS; SUBCUTANEOUS at 09:54

## 2022-11-20 RX ADMIN — METRONIDAZOLE 500 MG: 500 INJECTION, SOLUTION INTRAVENOUS at 09:54

## 2022-11-20 RX ADMIN — LORAZEPAM 0.5 MG: 2 INJECTION INTRAMUSCULAR; INTRAVENOUS at 09:45

## 2022-11-20 RX ADMIN — CEFTRIAXONE 1000 MG: 1 INJECTION, POWDER, FOR SOLUTION INTRAMUSCULAR; INTRAVENOUS at 05:46

## 2022-11-20 ASSESSMENT — PAIN SCALES - GENERAL
PAINLEVEL_OUTOF10: 0
PAINLEVEL_OUTOF10: 4

## 2022-11-20 ASSESSMENT — PAIN DESCRIPTION - DESCRIPTORS: DESCRIPTORS: ACHING;SORE;DISCOMFORT

## 2022-11-20 ASSESSMENT — PAIN DESCRIPTION - LOCATION: LOCATION: ABDOMEN;THROAT

## 2022-11-20 ASSESSMENT — PAIN - FUNCTIONAL ASSESSMENT: PAIN_FUNCTIONAL_ASSESSMENT: PREVENTS OR INTERFERES SOME ACTIVE ACTIVITIES AND ADLS

## 2022-11-20 NOTE — CARE COORDINATION
CM following patient's chart. MD states that patient can discharge home as soon as possible. CM consult placed to assist with hospice - family requesting to go with [facility]) [phone]. [facility] contacted Machelle Brooks who states that she can admit patient tomorrow late afternoon, between 1-2 and she can have the DME's delivered to patient's home tomorrow morning, 11/21. She will be contacting the patient's  to finish referral and arrange time for him to meet the equipment company tomorrow morning. [facility]COM called Corey Black and arranged transportation for patient tomorrow ([facility] tried to do 1:30 but Corey Black asked if it could be 2:00). [facility]COM notified Machelle Brooks and let her know that transport has been arranged. [facility]COM will fax d/c summary and MAR to Machelle Brooks tomorrow once completed. She asked that information be faxed to [phone]. MD asked that hospice agency make recommendations for patient's seizure management at home. MALCOM let Machelle Brooks know and she stated she would pass it on to the doctor. CM will remain available to assist with transport home with hospice tomorrow. Machelle Brooks already let patient's  know that patient would be discharging between 1-2 tomorrow.

## 2022-11-20 NOTE — PROGRESS NOTES
Pt having small seizures/twitches, she does have a history of seizures. Dr. Yesenia Randhawa made aware. Ativan given. Verbal order given to place order for 250mg Keppra BID. Spoke with pharmacy to ask to verify dose and send as soon as possible.

## 2022-11-20 NOTE — PROGRESS NOTES
Hospitalist Progress Note   Admit Date:  2022  3:18 AM   Name:  Nuris Martinez   Age:  67 y.o. Sex:  female  :  1950   MRN:  [id number]   Room:  [facility]/    Presenting Complaint: Emesis     Reason(s) for Admission: Mild dehydration [E86.0]  Primary progressive aphasia ([address] [G31.01, F02.80]  Acute cystitis without hematuria [A56.77]  Acute metabolic encephalopathy [H16.42]  Dysphagia, unspecified type [R13.10]  Nausea and vomiting, unspecified vomiting type [R11.2]     Hospital Course:      Nuris Martinez is a 67 y.o. female with medical history of NEVAEH on CPAP who presented via EMS from home with nausea and vomiting at coffee-ground substance associated with constipation and no stool output of 1 week duration. EMS reported patient had a fever 102.4. Of note patient follows neurology Dr. Chai Landon and per note 22, progressive aphasia has been worse since  associated with frontotemporal lobe dementia. At some point she stayed at a nursing home/assisted living but now she lives with her  and son with total assist, diffuse difficulties communicating and ambulating. Per neurology's note 2022, reinitiate Aricept 10 mg nightly, continue Namenda 10 mg twice daily, Klonopin 0.25 mg twice daily. Off of Lamictal due to sedation. Keppra at current dose so low that is not treating her (20mg bid). On mainly vitamins and oils at home. In ED, CBC and CMP unremarkable. UA c/o UTI. Pt was started on Rocephin. Patient has failed MBS with recommendations for NPO per speech. Family has spoken with HM and palliative care and knows risks of aspiration but requested comfort feeding . Patient had informed family long ago that she would not want a feeding tube. Family has chosen to abide by her wishes. They  have chosen Hospice care for home with plans to transfer . Subjective & 24hr Events (22):   Spoke with , Bruce Wing, at bedside and daughter, Meryle Mulders, by phone. Family is in agreement with home hospice. Mike Rather is making arrangements for hospital bed delivery at home. He looks exhausted and confused. Have encouraged him to get rest. Pt able to utter some words this morning but don't know what she said. She remains lethargic with worsening wet cough. Have advised that I would not try any more \"comfort feeding\". Tristan worried about her feet movements possible being a seizure and has asked for benzo and keppra. Assessment & Plan:     # Risa Kelsey  - completed course of Rocephin, will DC    # CoNS pos blood culture  - suspected to be contaminant  - repeat blood culture 11/17 negative    # Progressive aphasia  # Severe frontal temporal lobe dementia  # History of seizure disorder  Patient follows neurology Dr. Coronado Co and per note, aphasia has been worse since 2012 associated with frontotemporal lobe dementia. At baseline, she lives with her  and son with total assist, diffuse difficulties communicating and ambulating. Nonverbal mostly and not following much commands. Per neurology's note 9/2/2022, reinitiate Aricept 10 mg nightly, continue Namenda 10 mg twice daily, Klonopin 0.25 mg twice daily. Off of Lamictal due to sedation. Keppra at current dose so low that is not treating her. On mainly vitamins and oils at home  CT head on admission shows chronic findings  Continue supportive care  S/P MBS with delayed penetration on 11/17 11/19  Home meds on hold for NPO  Cont IV D5 1/2NS  Limit sedating meds  Delirium precautions  PT/OT/ speech  scopalamine patch  Family requested comfort feeds. Long d/w family regarding what that entails. They are willing to accept risks. PC/Hospice following.  is not wishing to pursue tube feeding at this time as it was not in keeping with his wife's wishes. 11/20  Discussed with family my concern with continuing comfort feedings given worsening. Will make NPO again. Rapid bilateral feet movement.  Does not appear as seizures to me but will give trial of low dose ativan/ Keppra. Will need advice from hospice regarding best measures to control seizures - ?buccal benzo?     wanting to pursue [place] [facility] [phone] with Anand Maldonado    # Possible Aspiration Pneumonia  11/20 - CXR negative but intermittent low grade temps. Suspect ongoing low grade aspiration. Do not feel continuation of IV antibiotics necessary given goals of care. # Hypokalemia  Resolved with D5 1/2ns with KCL, continue for now    # Nausea and vomiting, resolved     # Constipation  Cont bowel regimen, 4 stools reported 11/17     # Ascending Aorta Aneurysm  Incidentally captured on CT chest measuring up to 4.1cm on admission     # Hypothyroidism  Cont home Synthroid      # NEVAEH  CPAP ill advised for risk aspiration         Anticipated discharge needs:      pending    Diet:  ADULT DIET; Dysphagia - Pureed  DVT PPx: scd  Code status: DNR    Hospital Problems:  Principal Problem:    Acute UTI  Active Problems:    Progressive aphasia    Seizure disorder ([address]    Frontotemporal dementia ([address]    Dehydration    Aspiration pneumonia (HCC)  Resolved Problems:    * No resolved hospital problems. *      Objective:   Patient Vitals for the past 24 hrs:   Temp Pulse Resp BP SpO2   11/20/22 1540 99 °F (37.2 °C) 78 18 111/80 94 %   11/20/22 1120 98.4 °F (36.9 °C) 79 16 124/80 90 %   11/20/22 0730 97.7 °F (36.5 °C) 81 16 (!) 146/102 99 %   11/20/22 0352 98.2 °F (36.8 °C) 81 20 (!) 147/80 94 %   11/19/22 2350 97.6 °F (36.4 °C) 84 20 (!) 134/93 94 %   11/19/22 2002 98.4 °F (36.9 °C) 75 19 127/77 94 %   11/19/22 1618 98.4 °F (36.9 °C) 78 18 126/79 96 %         Oxygen Therapy  SpO2: 94 %  O2 Device: None (Room air)    Estimated body mass index is 20.5 kg/m² as calculated from the following:    Height as of this encounter: 5' 6\" (1.676 m). Weight as of this encounter: 127 lb (57.6 kg).     Intake/Output Summary (Last 24 hours) at 11/20/2022 1607  Last data filed at 11/20/2022 1350  Gross per 24 hour   Intake --   Output 3150 ml   Net -3150 ml           Physical Exam:     Blood pressure 111/80, pulse 78, temperature 99 °F (37.2 °C), temperature source Axillary, resp. rate 18, height 5' 6\" (1.676 m), weight 127 lb (57.6 kg), SpO2 94 %. General:    Well nourished. Aphasic, lethargic  Head:  Normocephalic, atraumatic  Eyes:  Sclerae appear normal.  Pupils equally round. ENT:  Nares appear normal, no drainage. Moist oral mucosa  Neck:  No restricted ROM. Trachea midline   CV:   RRR. No m/r/g. No jugular venous distension. Lungs:   Upper airway congestion, bases clear  Abdomen: Bowel sounds present. Soft, nontender, nondistended. Extremities: No cyanosis or clubbing. No edema  Skin:     No rashes and normal coloration. Warm and dry.       I have personally reviewed labs and tests showing:  Recent Labs:  Recent Results (from the past 48 hour(s))   Comprehensive Metabolic Panel    Collection Time: 11/18/22  8:55 PM   Result Value Ref Range    Sodium 139 133 - 143 mmol/L    Potassium 3.6 3.5 - 5.1 mmol/L    Chloride 109 101 - 110 mmol/L    CO2 24 21 - 32 mmol/L    Anion Gap 6 2 - 11 mmol/L    Glucose 102 (H) 65 - 100 mg/dL    BUN 6 (L) 8 - 23 MG/DL    Creatinine 0.40 (L) 0.6 - 1.0 MG/DL    Est, Glom Filt Rate >60 >60 ml/min/1.73m2    Calcium 9.0 8.3 - 10.4 MG/DL    Total Bilirubin 0.3 0.2 - 1.1 MG/DL    ALT 19 12 - 65 U/L    AST 25 15 - 37 U/L    Alk Phosphatase 58 50 - 136 U/L    Total Protein 5.8 (L) 6.3 - 8.2 g/dL    Albumin 2.5 (L) 3.2 - 4.6 g/dL    Globulin 3.3 2.8 - 4.5 g/dL    Albumin/Globulin Ratio 0.8 0.4 - 1.6     CBC with Auto Differential    Collection Time: 11/19/22  5:40 AM   Result Value Ref Range    WBC 3.7 (L) 4.3 - 11.1 K/uL    RBC 3.79 (L) 4.05 - 5.2 M/uL    Hemoglobin 11.3 (L) 11.7 - 15.4 g/dL    Hematocrit 34.6 (L) 35.8 - 46.3 %    MCV 91.3 82 - 102 FL    MCH 29.8 26.1 - 32.9 PG    MCHC 32.7 31.4 - 35.0 g/dL    RDW 13.9 11.9 - 14.6 %    Platelets 051 534 - 810 K/uL    MPV 9.2 (L) 9.4 - 12.3 FL    nRBC 0.00 0.0 - 0.2 K/uL    Differential Type AUTOMATED      Seg Neutrophils 59 43 - 78 %    Lymphocytes 17 13 - 44 %    Monocytes 11 4.0 - 12.0 %    Eosinophils % 12 (H) 0.5 - 7.8 %    Basophils 1 0.0 - 2.0 %    Immature Granulocytes 0 0.0 - 5.0 %    Segs Absolute 2.2 1.7 - 8.2 K/UL    Absolute Lymph # 0.6 0.5 - 4.6 K/UL    Absolute Mono # 0.4 0.1 - 1.3 K/UL    Absolute Eos # 0.4 0.0 - 0.8 K/UL    Basophils Absolute 0.0 0.0 - 0.2 K/UL    Absolute Immature Granulocyte 0.0 0.0 - 0.5 K/UL   Vancomycin Level, Random    Collection Time: 11/19/22  5:40 AM   Result Value Ref Range    Vancomycin Rm 16.4 UG/ML   Comprehensive Metabolic Panel    Collection Time: 11/20/22  5:38 AM   Result Value Ref Range    Sodium 133 133 - 143 mmol/L    Potassium 3.9 3.5 - 5.1 mmol/L    Chloride 104 101 - 110 mmol/L    CO2 24 21 - 32 mmol/L    Anion Gap 5 2 - 11 mmol/L    Glucose 111 (H) 65 - 100 mg/dL    BUN 3 (L) 8 - 23 MG/DL    Creatinine 0.40 (L) 0.6 - 1.0 MG/DL    Est, Glom Filt Rate >60 >60 ml/min/1.73m2    Calcium 9.1 8.3 - 10.4 MG/DL    Total Bilirubin 0.4 0.2 - 1.1 MG/DL    ALT 22 12 - 65 U/L    AST 30 15 - 37 U/L    Alk Phosphatase 65 50 - 136 U/L    Total Protein 6.5 6.3 - 8.2 g/dL    Albumin 2.7 (L) 3.2 - 4.6 g/dL    Globulin 3.8 2.8 - 4.5 g/dL    Albumin/Globulin Ratio 0.7 0.4 - 1.6     CBC with Auto Differential    Collection Time: 11/20/22  5:38 AM   Result Value Ref Range    WBC 4.8 4.3 - 11.1 K/uL    RBC 4.17 4.05 - 5.2 M/uL    Hemoglobin 12.3 11.7 - 15.4 g/dL    Hematocrit 37.2 35.8 - 46.3 %    MCV 89.2 82 - 102 FL    MCH 29.5 26.1 - 32.9 PG    MCHC 33.1 31.4 - 35.0 g/dL    RDW 13.5 11.9 - 14.6 %    Platelets 975 250 - 774 K/uL    MPV 9.3 (L) 9.4 - 12.3 FL    nRBC 0.00 0.0 - 0.2 K/uL    Differential Type AUTOMATED      Seg Neutrophils 61 43 - 78 %    Lymphocytes 17 13 - 44 %    Monocytes 11 4.0 - 12.0 %    Eosinophils % 10 (H) 0.5 - 7.8 %    Basophils 1 0.0 - 2.0 %    Immature Granulocytes 0 0.0 - 5.0 %    Segs Absolute 3.0 1.7 - 8.2 K/UL    Absolute Lymph # 0.8 0.5 - 4.6 K/UL    Absolute Mono # 0.5 0.1 - 1.3 K/UL    Absolute Eos # 0.5 0.0 - 0.8 K/UL    Basophils Absolute 0.0 0.0 - 0.2 K/UL    Absolute Immature Granulocyte 0.0 0.0 - 0.5 K/UL       I have personally reviewed imaging studies showing: Other Studies:  XR CHEST PORTABLE   Final Result   Unremarkable portable chest radiograph. FL MODIFIED BARIUM SWALLOW W VIDEO   Final Result   1. No evidence of aspiration. Residuals retained within the piriformis with   resultant delayed penetration. Please see full report from speech language   pathologist.         [address] [facility] [address]   Final Result   Unremarkable renal ultrasound. No hydronephrosis. XR ABDOMEN (KUB) (SINGLE AP VIEW)   Final Result   No acute abnormality. No findings present to suspect bowel   obstruction. CT HEAD WO CONTRAST   Final Result   1. No CT evidence of acute intracranial process. 2. Persistent ventricular dilatation, not significantly changed. CT CHEST PULMONARY EMBOLISM W CONTRAST   Final Result   1. No evidence of pulmonary embolism. 2. No acute intrathoracic process. 3. Aneurysmal dilatation of the ascending aorta measuring up to 4.1 cm         XR CHEST PORTABLE   Final Result   There is mild pulmonary vascular prominence which may represent congestion. This   is best appreciated on the right. No consolidation or pleural effusion.              Current Meds:  Current Facility-Administered Medications   Medication Dose Route Frequency    levETIRAcetam (KEPPRA) 250 mg in sodium chloride 0.9 % 100 mL IVPB  250 mg IntraVENous Q12H    LORazepam (ATIVAN) injection 0.5 mg  0.5 mg IntraVENous Q4H PRN    dextrose 5 % and 0.45 % NaCl with KCl 40 mEq infusion   IntraVENous Continuous    scopolamine (TRANSDERM-SCOP) transdermal patch 1 patch  1 patch TransDERmal Q72H    metronidazole (FLAGYL) 500 mg in 0.9% NaCl 100 mL IVPB premix  500 mg IntraVENous Q12H    [Held by provider] levothyroxine (SYNTHROID) tablet 100 mcg  100 mcg Oral Daily    [Held by provider] memantine (NAMENDA) tablet 10 mg  10 mg Oral BID    sodium chloride flush 0.9 % injection 5-40 mL  5-40 mL IntraVENous 2 times per day    sodium chloride flush 0.9 % injection 5-40 mL  5-40 mL IntraVENous PRN    0.9 % sodium chloride infusion   IntraVENous PRN    ondansetron (ZOFRAN-ODT) disintegrating tablet 4 mg  4 mg Oral Q8H PRN    Or    ondansetron (ZOFRAN) injection 4 mg  4 mg IntraVENous Q6H PRN    polyethylene glycol (GLYCOLAX) packet 17 g  17 g Oral Daily PRN    acetaminophen (TYLENOL) tablet 650 mg  650 mg Oral Q6H PRN    Or    acetaminophen (TYLENOL) suppository 650 mg  650 mg Rectal Q6H PRN    pantoprazole (PROTONIX) 40 mg in sodium chloride (PF) 0.9 % 10 mL injection  40 mg IntraVENous Daily    cefTRIAXone (ROCEPHIN) 1,000 mg in sodium chloride 0.9 % 50 mL IVPB mini-bag  1,000 mg IntraVENous Q24H    [Held by provider] aspirin EC tablet 81 mg  81 mg Oral Daily    [Held by provider] clonazePAM (KLONOPIN) tablet 0.25 mg  0.25 mg Oral Q12H    polyethylene glycol (GLYCOLAX) packet 17 g  17 g Oral Daily    fluticasone (FLONASE) 50 MCG/ACT nasal spray 1 spray  1 spray Each Nostril Daily       Signed:  Anupama Miller DO    Part of this note may have been written by using a voice dictation software. The note has been proof read but may still contain some grammatical/other typographical errors.

## 2022-11-21 VITALS
HEIGHT: 66 IN | TEMPERATURE: 97.9 F | WEIGHT: 127.1 LBS | BODY MASS INDEX: 20.43 KG/M2 | HEART RATE: 65 BPM | SYSTOLIC BLOOD PRESSURE: 113 MMHG | DIASTOLIC BLOOD PRESSURE: 95 MMHG | RESPIRATION RATE: 18 BRPM | OXYGEN SATURATION: 93 %

## 2022-11-21 PROBLEM — N39.0 ACUTE UTI: Status: RESOLVED | Noted: 2022-11-16 | Resolved: 2022-11-21

## 2022-11-21 LAB
BACTERIA SPEC CULT: ABNORMAL
GRAM STN SPEC: ABNORMAL
SERVICE CMNT-IMP: ABNORMAL

## 2022-11-21 PROCEDURE — 6360000002 HC RX W HCPCS: Performed by: HOSPITALIST

## 2022-11-21 PROCEDURE — 6370000000 HC RX 637 (ALT 250 FOR IP): Performed by: INTERNAL MEDICINE

## 2022-11-21 PROCEDURE — A4216 STERILE WATER/SALINE, 10 ML: HCPCS | Performed by: FAMILY MEDICINE

## 2022-11-21 PROCEDURE — 2580000003 HC RX 258: Performed by: FAMILY MEDICINE

## 2022-11-21 PROCEDURE — C9113 INJ PANTOPRAZOLE SODIUM, VIA: HCPCS | Performed by: FAMILY MEDICINE

## 2022-11-21 PROCEDURE — 6360000002 HC RX W HCPCS: Performed by: INTERNAL MEDICINE

## 2022-11-21 PROCEDURE — 6360000002 HC RX W HCPCS: Performed by: FAMILY MEDICINE

## 2022-11-21 PROCEDURE — 2580000003 HC RX 258: Performed by: INTERNAL MEDICINE

## 2022-11-21 PROCEDURE — 2500000003 HC RX 250 WO HCPCS: Performed by: INTERNAL MEDICINE

## 2022-11-21 RX ADMIN — SODIUM CHLORIDE 40 MG: 9 INJECTION, SOLUTION INTRAMUSCULAR; INTRAVENOUS; SUBCUTANEOUS at 08:25

## 2022-11-21 RX ADMIN — FLUTICASONE PROPIONATE 1 SPRAY: 50 SPRAY, METERED NASAL at 08:32

## 2022-11-21 RX ADMIN — METRONIDAZOLE 500 MG: 500 INJECTION, SOLUTION INTRAVENOUS at 08:24

## 2022-11-21 RX ADMIN — POTASSIUM CHLORIDE, DEXTROSE MONOHYDRATE AND SODIUM CHLORIDE: 300; 5; 450 INJECTION, SOLUTION INTRAVENOUS at 07:08

## 2022-11-21 RX ADMIN — SODIUM CHLORIDE 250 MG: 9 INJECTION, SOLUTION INTRAVENOUS at 10:48

## 2022-11-21 RX ADMIN — MORPHINE SULFATE 2 MG: 2 INJECTION, SOLUTION INTRAMUSCULAR; INTRAVENOUS at 14:32

## 2022-11-21 RX ADMIN — SODIUM CHLORIDE, PRESERVATIVE FREE 10 ML: 5 INJECTION INTRAVENOUS at 08:26

## 2022-11-21 NOTE — PROGRESS NOTES
Nutrition:    Family has moved forward with hospice. Patient has made her wishes clear to not have feeding tube and per SLP should remain NPO due to aspiration risk. Will sign off.   Electronically signed by Marcella Meza MS, RD, LD on 11/21/2022 at 10:38 AM.

## 2022-11-21 NOTE — PROGRESS NOTES
SPEECH LANGUAGE PATHOLOGY NOTE    Per chart, family has decided to pursue home hospice. Will discontinue SLP orders. Please reconsult if new concerns arise.      [address] CCC-SLP

## 2022-11-21 NOTE — DISCHARGE SUMMARY
Hospitalist Discharge Summary   Admit Date:  2022  3:18 AM   DC Note date: 2022  Name:  Oswaldo Root   Age:  67 y.o. Sex:  female  :  1950   MRN:  [id number]   Room:  [facility]  PCP:  Nusrat Craig MD    Presenting Complaint: Emesis     Initial Admission Diagnosis: Mild dehydration [E86.0]  Primary progressive aphasia ([address] [G31.01, F02.80]  Acute cystitis without hematuria [O41.12]  Acute metabolic encephalopathy [D69.96]  Dysphagia, unspecified type [R13.10]  Nausea and vomiting, unspecified vomiting type [R11.2]     Problem List for this Hospitalization (present on admission):    Principal Problem (Resolved):    Acute UTI  Active Problems:    Progressive aphasia    Seizure disorder ([address]    Frontotemporal dementia ([address]    Dehydration    Aspiration pneumonia [facility])      Hospital Course:  67 y.o. female with medical history of NEVAEH on CPAP who presented via EMS from home with nausea and vomiting at coffee-ground substance associated with constipation and no stool output of 1 week duration. EMS reported patient had a fever 102.4. Of note patient follows neurology Dr. Perry Sandoval and per note 22, progressive aphasia has been worse since  associated with frontotemporal lobe dementia. At some point she stayed at a nursing home/assisted living but now she lives with her  and son with total assist, diffuse difficulties communicating and ambulating. Per neurology's note 2022, reinitiate Aricept 10 mg nightly, continue Namenda 10 mg twice daily, Klonopin 0.25 mg twice daily. Off of Lamictal due to sedation. Keppra at current dose so low that is not treating her (20mg bid). On mainly vitamins and oils at home. In ED, CBC and CMP unremarkable. UA c/owUTI. Pt was started on Rocephin. Patient has failed MBS with recommendations for NPO per speech. Family has spoken with HM and palliative care and knows risks of aspiration but requested comfort feeding .  Patient had informed family long ago that she would not want a feeding tube. Family has chosen to abide by her wishes. They  have chosen [facility]osp[facility] care for home with plans to transfer 11/21. She had some shaking concerning for possible seizures so was started on IV Keppra, which resolved the shaking. She remained stable and was discharged home with hospice. Disposition: Home with Hospice  Diet: If family wishes to pursue oral diet for pleasure-  Recommend pureed consistencies and thin liquids via tsp using careful hand feeding strategies        Compensatory strategies/Careful hand feeding strategies  Small bites   Liquids via tsp  Slow rate of intake  Smaller more frequent intake  Upright during oral intake and remain upright 30 min after  No force feeding  Other recommendations: Oral care, elevate HOB     Code Status: DNR    Follow Ups:   Follow-up Information     YOLIS ROJO MD. Schedule an appointment as soon as possible for a   visit. Specialty: Family Medicine  Why: As needed  Contact information:  [address] [place] [address]  [phone]             Elijah Gavin MD .    Specialty: Family Medicine  Contact information:  [address] [place] [address]  [phone]             Ankita Castillo DO. Schedule an appointment as soon as possible for a   visit. Specialty: Neurology  Why: As needed  Contact information:  Ruby Flynn [address] [place] [address]  [phone]                       Time spent in patient discharge and coordination 36 minutes. Follow up labs/diagnostics (ultimately defer to outpatient provider):  None    Plan was discussed with  at bedside, nursing, and case management. All questions answered. Patient was stable at time of discharge. Instructions given to call a physician or return if any concerns.     Current Discharge Medication List        CONTINUE these medications which have NOT CHANGED    Details   clonazePAM (KLONOPIN) 0.5 MG tablet Take 0.25 mg by mouth 2 times daily as needed. levETIRAcetam (KEPPRA) 100 MG/ML solution Take 20 mg by mouth 2 times daily Family has weaned pt      levothyroxine (SYNTHROID) 75 MCG tablet Take 100 mcg by mouth daily      memantine (NAMENDA) 10 MG tablet Take 10 mg by mouth 2 times daily           STOP taking these medications       aspirin 81 MG chewable tablet Comments:   Reason for Stopping:         donepezil (ARICEPT) 10 MG tablet Comments:   Reason for Stopping:         citalopram (CELEXA) 20 MG tablet Comments:   Reason for Stopping:         lamoTRIgine (LAMICTAL) 25 MG tablet Comments:   Reason for Stopping:         mirtazapine (REMERON) 15 MG tablet Comments:   Reason for Stopping:               Procedures done this admission:  * No surgery found *    Consults this admission:  IP CONSULT TO PHARMACY  IP CONSULT TO PALLIATIVE CARE  IP CONSULT TO PALLIATIVE CARE  IP CONSULT TO HOSPICE  IP CONSULT TO CASE MANAGEMENT    Echocardiogram results:  No results found for this or any previous visit. Diagnostic Imaging/Tests:   XR ABDOMEN (KUB) (SINGLE AP VIEW)    Result Date: 11/16/2022  No acute abnormality. No findings present to suspect bowel obstruction. CT HEAD WO CONTRAST    Result Date: 11/16/2022  1. No CT evidence of acute intracranial process. 2. Persistent ventricular dilatation, not significantly changed. XR CHEST PORTABLE    Result Date: 11/19/2022  Unremarkable portable chest radiograph. XR CHEST PORTABLE    Result Date: 11/16/2022  There is mild pulmonary vascular prominence which may represent congestion. This is best appreciated on the right. No consolidation or pleural effusion. CT CHEST PULMONARY EMBOLISM W CONTRAST    Result Date: 11/16/2022  1. No evidence of pulmonary embolism. 2. No acute intrathoracic process. 3. Aneurysmal dilatation of the ascending aorta measuring up to 4.1 cm     US RETROPERITONEAL COMPLETE    Result Date: 11/16/2022  Unremarkable renal ultrasound.  No hydronephrosis. FL MODIFIED BARIUM SWALLOW W VIDEO    Result Date: 11/17/2022  1. No evidence of aspiration. Residuals retained within the piriformis with resultant delayed penetration.  Please see full report from speech language pathologist.        Labs: Results:       BMP, Mg, Phos Recent Labs     11/18/22 2055 11/20/22  0538    133   K 3.6 3.9    104   CO2 24 24   ANIONGAP 6 5   BUN 6* 3*   CREATININE 0.40* 0.40*   LABGLOM >60 >60   CALCIUM 9.0 9.1   GLUCOSE 102* 111*      CBC Recent Labs     11/19/22  0540 11/20/22  0538   WBC 3.7* 4.8   RBC 3.79* 4.17   HGB 11.3* 12.3   HCT 34.6* 37.2   MCV 91.3 89.2   MCH 29.8 29.5   MCHC 32.7 33.1   RDW 13.9 13.5    314   MPV 9.2* 9.3*   NRBC 0.00 0.00   SEGS 59 61   LYMPHOPCT 17 17   EOSRELPCT 12* 10*   MONOPCT 11 11   BASOPCT 1 1   IMMGRAN 0 0   SEGSABS 2.2 3.0   LYMPHSABS 0.6 0.8   EOSABS 0.4 0.5   MONOSABS 0.4 0.5   BASOSABS 0.0 0.0   ABSIMMGRAN 0.0 0.0      LFT Recent Labs     11/18/22 2055 11/20/22  0538   BILITOT 0.3 0.4   ALKPHOS 58 65   AST 25 30   ALT 19 22   PROT 5.8* 6.5   LABALBU 2.5* 2.7*   GLOB 3.3 3.8      Cardiac  Lab Results   Component Value Date/Time    NTPROBNP 309 11/16/2022 03:45 AM    TROPHS 62.0 11/16/2022 06:12 AM    TROPHS 64.0 11/16/2022 03:45 AM      Coags No results found for: PROTIME, INR, APTT   A1c No results found for: LABA1C, EAG   Lipids No results found for: CHOL, LDLCALC, LABVLDL, HDL, CHOLHDLRATIO, TRIG   Thyroid  No results found for: Gagandeep Vijay     Most Recent UA Lab Results   Component Value Date/Time    COLORU YELLOW 11/16/2022 03:57 AM    APPEARANCE CLOUDY 11/16/2022 03:57 AM    SPECGRAV 1.020 11/16/2022 03:57 AM    LABPH 6.5 11/16/2022 03:57 AM    PROTEINU Negative 11/16/2022 03:57 AM    GLUCOSEU Negative 11/16/2022 03:57 AM    KETUA 15 11/16/2022 03:57 AM    BILIRUBINUR Negative 11/16/2022 03:57 AM    BLOODU Negative 11/16/2022 03:57 AM    UROBILINOGEN 1.0 11/16/2022 03:57 AM    NITRU Positive 11/16/2022 03:57 AM    LEUKOCYTESUR TRACE 11/16/2022 03:57 AM    WBCUA 20-50 11/16/2022 03:57 AM    RBCUA 3-5 11/16/2022 03:57 AM    EPITHUA 5-10 11/16/2022 03:57 AM    BACTERIA 4+ 11/16/2022 03:57 AM    LABCAST 3-5 11/16/2022 03:57 AM    MUCUS 2+ 11/16/2022 03:57 AM        Recent Labs     11/17/22  1805 11/16/22  0357 11/16/22  0347   CULTURE NO GROWTH 3 DAYS >100,000 COLONIES/mL ESCHERICHIA COLI*  <10,000 COLONIES/mL MIXED SKIN JOEY ISOLATED STAPHYLOCOCCUS SPECIES, COAGULASE NEGATIVE This organism may be indicative of culture contamination. Clinical correlation needs to be evaluated as each case is unique. *  ALPHA STREPTOCOCCUS This organism may be indicative of culture contamination. Clinical correlation needs to be evaluated as each case is unique. *  Refer to Blood Culture ID Panel Accession [id number]  CULTURE IN PROGRESS,FURTHER UPDATES TO FOLLOW       All Labs from Last 24 Hrs:  No results found for this or any previous visit (from the past 24 hour(s)). Allergies   Allergen Reactions    Sulfa Antibiotics Other (See Comments)     Hematuria    Wasp Venom Protein Hives and Swelling    Penicillins Rash     Immunization History   Administered Date(s) Administered    COVID-19, PFIZER PURPLE top, DILUTE for use, (age 15 y+), 30mcg/0.3mL 08/27/2021       Recent Vital Data:  Patient Vitals for the past 24 hrs:   Temp Pulse Resp BP SpO2   11/21/22 0721 97.5 °F (36.4 °C) 68 18 120/81 93 %   11/21/22 0316 98.2 °F (36.8 °C) 68 18 110/72 92 %   11/20/22 2309 99.1 °F (37.3 °C) 81 18 127/83 93 %   11/20/22 2222 -- -- 16 -- --   11/20/22 2152 -- -- 16 -- --   11/20/22 1923 98.4 °F (36.9 °C) 83 18 121/88 95 %   11/20/22 1540 99 °F (37.2 °C) 78 18 111/80 94 %   11/20/22 1120 98.4 °F (36.9 °C) 79 16 124/80 90 %       Oxygen Therapy  SpO2: 93 %  O2 Device: None (Room air)    Estimated body mass index is 20.51 kg/m² as calculated from the following:    Height as of this encounter: 5' 6\" (1.676 m).     Weight as of this encounter: 127 lb 1.6 oz (57.7 kg). Intake/Output Summary (Last 24 hours) at 11/21/2022 0948  Last data filed at 11/21/2022 0537  Gross per 24 hour   Intake --   Output 1900 ml   Net -1900 ml         Physical Exam:  General:    Well nourished. No overt distress. Sleeping soundly. Head:  Normocephalic, atraumatic  Eyes:  Sclerae appear normal.  Pupils equally round. HENT:  Nares appear normal, no drainage. Moist mucous membranes  Neck:  No restricted ROM. Trachea midline  CV:   RRR. No m/r/g. No JVD  Lungs:   CTAB. No wheezing, rhonchi, or rales. Respirations even, unlabored  Abdomen:   Soft,  nondistended. Extremities: Warm and dry. No cyanosis or clubbing. No edema. Skin:     No rashes. Normal coloration  Neuro:  CN II-XII grossly intact. Resting comfortably.   Psych:  Resting comfortably    Signed:  Torie Cain,

## 2022-11-21 NOTE — CARE COORDINATION
[facility]ALCOM received phone call from Sridhar Hager (RN) with [facility]. [phone]. She had multiple questions in regards to medications, etc - questions that RNYesica Call and Addison CRUZ answered for her. She asked CM to change transportation 3:00 to give them time to get equipment out. CM called Cathy Rupa and changed transportation time to 3:00. Patient's  at bedside notified. MALCOM will fax d/c summary shortly. 4:30 - D/c summary and MAR faxed to Santos Baptiste (RN with [facility]) at [phone]. No other needs identified.

## 2022-11-21 NOTE — PROGRESS NOTES
Resting in bed. Remains lethargic. IVF infusing. Treated for pain per [facility] and is effective. Hourly rounds completed, all needs met this shift. Bed in L/L with call light in reach. Report to be given to dayshift nurse.  at bedside.

## 2022-11-22 LAB
BACTERIA SPEC CULT: NORMAL
SERVICE CMNT-IMP: NORMAL

## 2022-11-23 LAB
BACTERIA SPEC CULT: ABNORMAL
BACTERIA SPEC CULT: ABNORMAL
GRAM STN SPEC: ABNORMAL
SERVICE CMNT-IMP: ABNORMAL